# Patient Record
Sex: MALE | Race: WHITE | NOT HISPANIC OR LATINO | Employment: PART TIME | URBAN - METROPOLITAN AREA
[De-identification: names, ages, dates, MRNs, and addresses within clinical notes are randomized per-mention and may not be internally consistent; named-entity substitution may affect disease eponyms.]

---

## 2019-12-12 ENCOUNTER — ANESTHESIA (OUTPATIENT)
Dept: SURGERY | Facility: CLINIC | Age: 69
DRG: 354 | End: 2019-12-12
Payer: MEDICARE

## 2019-12-12 ENCOUNTER — APPOINTMENT (OUTPATIENT)
Dept: CT IMAGING | Facility: CLINIC | Age: 69
DRG: 354 | End: 2019-12-12
Attending: EMERGENCY MEDICINE
Payer: MEDICARE

## 2019-12-12 ENCOUNTER — HOSPITAL ENCOUNTER (INPATIENT)
Facility: CLINIC | Age: 69
LOS: 2 days | Discharge: HOME | DRG: 354 | End: 2019-12-14
Attending: EMERGENCY MEDICINE | Admitting: SURGERY
Payer: MEDICARE

## 2019-12-12 ENCOUNTER — ANESTHESIA EVENT (OUTPATIENT)
Dept: SURGERY | Facility: CLINIC | Age: 69
DRG: 354 | End: 2019-12-12
Payer: MEDICARE

## 2019-12-12 DIAGNOSIS — K56.609 SBO (SMALL BOWEL OBSTRUCTION) (H): ICD-10-CM

## 2019-12-12 DIAGNOSIS — Z87.19 S/P REPAIR OF VENTRAL HERNIA: Primary | ICD-10-CM

## 2019-12-12 DIAGNOSIS — Z98.890 S/P REPAIR OF VENTRAL HERNIA: Primary | ICD-10-CM

## 2019-12-12 DIAGNOSIS — K42.0 INCARCERATED UMBILICAL HERNIA: ICD-10-CM

## 2019-12-12 PROBLEM — K46.0 INCARCERATED HERNIA: Status: ACTIVE | Noted: 2019-12-12

## 2019-12-12 PROBLEM — K43.6 VENTRAL HERNIA WITH OBSTRUCTION BUT NO GANGRENE: Status: ACTIVE | Noted: 2019-12-12

## 2019-12-12 LAB
ABO + RH BLD: NORMAL
ABO + RH BLD: NORMAL
ANION GAP SERPL CALCULATED.3IONS-SCNC: 5 MMOL/L (ref 3–14)
APTT PPP: 29 SEC (ref 22–37)
BASOPHILS # BLD AUTO: 0 10E9/L (ref 0–0.2)
BASOPHILS NFR BLD AUTO: 0.3 %
BLD GP AB SCN SERPL QL: NORMAL
BLOOD BANK CMNT PATIENT-IMP: NORMAL
BUN SERPL-MCNC: 15 MG/DL (ref 7–30)
CALCIUM SERPL-MCNC: 8.9 MG/DL (ref 8.5–10.1)
CHLORIDE SERPL-SCNC: 106 MMOL/L (ref 94–109)
CO2 SERPL-SCNC: 27 MMOL/L (ref 20–32)
CREAT SERPL-MCNC: 1.07 MG/DL (ref 0.66–1.25)
DIFFERENTIAL METHOD BLD: ABNORMAL
EOSINOPHIL # BLD AUTO: 0.1 10E9/L (ref 0–0.7)
EOSINOPHIL NFR BLD AUTO: 0.8 %
ERYTHROCYTE [DISTWIDTH] IN BLOOD BY AUTOMATED COUNT: 13.2 % (ref 10–15)
GFR SERPL CREATININE-BSD FRML MDRD: 71 ML/MIN/{1.73_M2}
GLUCOSE BLDC GLUCOMTR-MCNC: 126 MG/DL (ref 70–99)
GLUCOSE SERPL-MCNC: 141 MG/DL (ref 70–99)
HCT VFR BLD AUTO: 46.8 % (ref 40–53)
HGB BLD-MCNC: 15.4 G/DL (ref 13.3–17.7)
IMM GRANULOCYTES # BLD: 0.1 10E9/L (ref 0–0.4)
IMM GRANULOCYTES NFR BLD: 0.6 %
INR PPP: 0.98 (ref 0.86–1.14)
LYMPHOCYTES # BLD AUTO: 1.1 10E9/L (ref 0.8–5.3)
LYMPHOCYTES NFR BLD AUTO: 10.1 %
MCH RBC QN AUTO: 30.9 PG (ref 26.5–33)
MCHC RBC AUTO-ENTMCNC: 32.9 G/DL (ref 31.5–36.5)
MCV RBC AUTO: 94 FL (ref 78–100)
MONOCYTES # BLD AUTO: 0.6 10E9/L (ref 0–1.3)
MONOCYTES NFR BLD AUTO: 5.6 %
NEUTROPHILS # BLD AUTO: 8.6 10E9/L (ref 1.6–8.3)
NEUTROPHILS NFR BLD AUTO: 82.6 %
NRBC # BLD AUTO: 0 10*3/UL
NRBC BLD AUTO-RTO: 0 /100
PLATELET # BLD AUTO: 323 10E9/L (ref 150–450)
POTASSIUM SERPL-SCNC: 4.5 MMOL/L (ref 3.4–5.3)
RBC # BLD AUTO: 4.99 10E12/L (ref 4.4–5.9)
SODIUM SERPL-SCNC: 138 MMOL/L (ref 133–144)
SPECIMEN EXP DATE BLD: NORMAL
WBC # BLD AUTO: 10.4 10E9/L (ref 4–11)

## 2019-12-12 PROCEDURE — 25800030 ZZH RX IP 258 OP 636: Performed by: ANESTHESIOLOGY

## 2019-12-12 PROCEDURE — 36000052 ZZH SURGERY LEVEL 2 EA 15 ADDTL MIN: Performed by: SURGERY

## 2019-12-12 PROCEDURE — 71000012 ZZH RECOVERY PHASE 1 LEVEL 1 FIRST HR: Performed by: SURGERY

## 2019-12-12 PROCEDURE — 00000146 ZZHCL STATISTIC GLUCOSE BY METER IP

## 2019-12-12 PROCEDURE — 86850 RBC ANTIBODY SCREEN: CPT | Performed by: EMERGENCY MEDICINE

## 2019-12-12 PROCEDURE — 36000050 ZZH SURGERY LEVEL 2 1ST 30 MIN: Performed by: SURGERY

## 2019-12-12 PROCEDURE — 40000306 ZZH STATISTIC PRE PROC ASSESS II: Performed by: SURGERY

## 2019-12-12 PROCEDURE — 25800030 ZZH RX IP 258 OP 636: Performed by: SURGERY

## 2019-12-12 PROCEDURE — 25800030 ZZH RX IP 258 OP 636: Performed by: NURSE ANESTHETIST, CERTIFIED REGISTERED

## 2019-12-12 PROCEDURE — 88302 TISSUE EXAM BY PATHOLOGIST: CPT | Mod: 26 | Performed by: SURGERY

## 2019-12-12 PROCEDURE — 71000013 ZZH RECOVERY PHASE 1 LEVEL 1 EA ADDTL HR: Performed by: SURGERY

## 2019-12-12 PROCEDURE — 88302 TISSUE EXAM BY PATHOLOGIST: CPT | Performed by: SURGERY

## 2019-12-12 PROCEDURE — 86901 BLOOD TYPING SEROLOGIC RH(D): CPT | Performed by: EMERGENCY MEDICINE

## 2019-12-12 PROCEDURE — 25000132 ZZH RX MED GY IP 250 OP 250 PS 637: Mod: GY | Performed by: SURGERY

## 2019-12-12 PROCEDURE — 25000128 H RX IP 250 OP 636: Performed by: SURGERY

## 2019-12-12 PROCEDURE — 74176 CT ABD & PELVIS W/O CONTRAST: CPT

## 2019-12-12 PROCEDURE — 99221 1ST HOSP IP/OBS SF/LOW 40: CPT | Mod: 57 | Performed by: SURGERY

## 2019-12-12 PROCEDURE — 85025 COMPLETE CBC W/AUTO DIFF WBC: CPT | Performed by: EMERGENCY MEDICINE

## 2019-12-12 PROCEDURE — 25000125 ZZHC RX 250: Performed by: NURSE ANESTHETIST, CERTIFIED REGISTERED

## 2019-12-12 PROCEDURE — 25000128 H RX IP 250 OP 636: Performed by: NURSE ANESTHETIST, CERTIFIED REGISTERED

## 2019-12-12 PROCEDURE — 27210794 ZZH OR GENERAL SUPPLY STERILE: Performed by: SURGERY

## 2019-12-12 PROCEDURE — 85730 THROMBOPLASTIN TIME PARTIAL: CPT | Performed by: EMERGENCY MEDICINE

## 2019-12-12 PROCEDURE — 49561 ZZHC REPAIR INITIAL INCISIONAL HERNIA; INCARCERATED OR STRANGULATED: CPT | Performed by: SURGERY

## 2019-12-12 PROCEDURE — 25000125 ZZHC RX 250: Performed by: SURGERY

## 2019-12-12 PROCEDURE — 86900 BLOOD TYPING SEROLOGIC ABO: CPT | Performed by: EMERGENCY MEDICINE

## 2019-12-12 PROCEDURE — 37000008 ZZH ANESTHESIA TECHNICAL FEE, 1ST 30 MIN: Performed by: SURGERY

## 2019-12-12 PROCEDURE — 0WQF0ZZ REPAIR ABDOMINAL WALL, OPEN APPROACH: ICD-10-PCS | Performed by: SURGERY

## 2019-12-12 PROCEDURE — 12000000 ZZH R&B MED SURG/OB

## 2019-12-12 PROCEDURE — 85610 PROTHROMBIN TIME: CPT | Performed by: EMERGENCY MEDICINE

## 2019-12-12 PROCEDURE — 49561 ZZHC REPAIR INITIAL INCISIONAL HERNIA; INCARCERATED OR STRANGULATED: CPT | Mod: AS | Performed by: PHYSICIAN ASSISTANT

## 2019-12-12 PROCEDURE — 99285 EMERGENCY DEPT VISIT HI MDM: CPT | Mod: 25

## 2019-12-12 PROCEDURE — 93010 ELECTROCARDIOGRAM REPORT: CPT | Performed by: INTERNAL MEDICINE

## 2019-12-12 PROCEDURE — 96374 THER/PROPH/DIAG INJ IV PUSH: CPT | Mod: 59

## 2019-12-12 PROCEDURE — 37000009 ZZH ANESTHESIA TECHNICAL FEE, EACH ADDTL 15 MIN: Performed by: SURGERY

## 2019-12-12 PROCEDURE — 25000128 H RX IP 250 OP 636: Performed by: EMERGENCY MEDICINE

## 2019-12-12 PROCEDURE — 80048 BASIC METABOLIC PNL TOTAL CA: CPT | Performed by: EMERGENCY MEDICINE

## 2019-12-12 RX ORDER — OXYCODONE HYDROCHLORIDE 5 MG/1
5-10 TABLET ORAL EVERY 4 HOURS PRN
Status: DISCONTINUED | OUTPATIENT
Start: 2019-12-12 | End: 2019-12-14 | Stop reason: HOSPADM

## 2019-12-12 RX ORDER — ONDANSETRON 2 MG/ML
INJECTION INTRAMUSCULAR; INTRAVENOUS PRN
Status: DISCONTINUED | OUTPATIENT
Start: 2019-12-12 | End: 2019-12-12

## 2019-12-12 RX ORDER — FENTANYL CITRATE 50 UG/ML
25-50 INJECTION, SOLUTION INTRAMUSCULAR; INTRAVENOUS
Status: DISCONTINUED | OUTPATIENT
Start: 2019-12-12 | End: 2019-12-12 | Stop reason: HOSPADM

## 2019-12-12 RX ORDER — LIDOCAINE HYDROCHLORIDE 20 MG/ML
JELLY TOPICAL ONCE
Status: COMPLETED | OUTPATIENT
Start: 2019-12-12 | End: 2019-12-13

## 2019-12-12 RX ORDER — NALOXONE HYDROCHLORIDE 0.4 MG/ML
.1-.4 INJECTION, SOLUTION INTRAMUSCULAR; INTRAVENOUS; SUBCUTANEOUS
Status: DISCONTINUED | OUTPATIENT
Start: 2019-12-12 | End: 2019-12-14 | Stop reason: HOSPADM

## 2019-12-12 RX ORDER — GLYCOPYRROLATE 0.2 MG/ML
INJECTION, SOLUTION INTRAMUSCULAR; INTRAVENOUS PRN
Status: DISCONTINUED | OUTPATIENT
Start: 2019-12-12 | End: 2019-12-12

## 2019-12-12 RX ORDER — ONDANSETRON 4 MG/1
4 TABLET, ORALLY DISINTEGRATING ORAL EVERY 6 HOURS PRN
Status: DISCONTINUED | OUTPATIENT
Start: 2019-12-12 | End: 2019-12-14 | Stop reason: HOSPADM

## 2019-12-12 RX ORDER — SULFAMETHOXAZOLE/TRIMETHOPRIM 800-160 MG
1 TABLET ORAL 2 TIMES DAILY
Status: DISCONTINUED | OUTPATIENT
Start: 2019-12-12 | End: 2019-12-14 | Stop reason: HOSPADM

## 2019-12-12 RX ORDER — NALOXONE HYDROCHLORIDE 0.4 MG/ML
.1-.4 INJECTION, SOLUTION INTRAMUSCULAR; INTRAVENOUS; SUBCUTANEOUS
Status: DISCONTINUED | OUTPATIENT
Start: 2019-12-12 | End: 2019-12-12 | Stop reason: HOSPADM

## 2019-12-12 RX ORDER — METOCLOPRAMIDE 10 MG/1
10 TABLET ORAL EVERY 6 HOURS PRN
Status: DISCONTINUED | OUTPATIENT
Start: 2019-12-12 | End: 2019-12-12 | Stop reason: HOSPADM

## 2019-12-12 RX ORDER — NEOSTIGMINE METHYLSULFATE 1 MG/ML
VIAL (ML) INJECTION PRN
Status: DISCONTINUED | OUTPATIENT
Start: 2019-12-12 | End: 2019-12-12

## 2019-12-12 RX ORDER — HYDRALAZINE HYDROCHLORIDE 20 MG/ML
2.5-5 INJECTION INTRAMUSCULAR; INTRAVENOUS EVERY 10 MIN PRN
Status: DISCONTINUED | OUTPATIENT
Start: 2019-12-12 | End: 2019-12-12 | Stop reason: HOSPADM

## 2019-12-12 RX ORDER — HYDROMORPHONE HYDROCHLORIDE 1 MG/ML
.3-.5 INJECTION, SOLUTION INTRAMUSCULAR; INTRAVENOUS; SUBCUTANEOUS EVERY 10 MIN PRN
Status: DISCONTINUED | OUTPATIENT
Start: 2019-12-12 | End: 2019-12-12 | Stop reason: HOSPADM

## 2019-12-12 RX ORDER — BUPIVACAINE HYDROCHLORIDE 5 MG/ML
INJECTION, SOLUTION EPIDURAL; INTRACAUDAL PRN
Status: DISCONTINUED | OUTPATIENT
Start: 2019-12-12 | End: 2019-12-12 | Stop reason: HOSPADM

## 2019-12-12 RX ORDER — ONDANSETRON 2 MG/ML
4 INJECTION INTRAMUSCULAR; INTRAVENOUS EVERY 30 MIN PRN
Status: DISCONTINUED | OUTPATIENT
Start: 2019-12-12 | End: 2019-12-12 | Stop reason: HOSPADM

## 2019-12-12 RX ORDER — LIDOCAINE 40 MG/G
CREAM TOPICAL
Status: DISCONTINUED | OUTPATIENT
Start: 2019-12-12 | End: 2019-12-14 | Stop reason: HOSPADM

## 2019-12-12 RX ORDER — DEXAMETHASONE SODIUM PHOSPHATE 4 MG/ML
INJECTION, SOLUTION INTRA-ARTICULAR; INTRALESIONAL; INTRAMUSCULAR; INTRAVENOUS; SOFT TISSUE PRN
Status: DISCONTINUED | OUTPATIENT
Start: 2019-12-12 | End: 2019-12-12

## 2019-12-12 RX ORDER — DEXAMETHASONE SODIUM PHOSPHATE 4 MG/ML
4 INJECTION, SOLUTION INTRA-ARTICULAR; INTRALESIONAL; INTRAMUSCULAR; INTRAVENOUS; SOFT TISSUE EVERY 10 MIN PRN
Status: DISCONTINUED | OUTPATIENT
Start: 2019-12-12 | End: 2019-12-12 | Stop reason: HOSPADM

## 2019-12-12 RX ORDER — DIMENHYDRINATE 50 MG/ML
25 INJECTION, SOLUTION INTRAMUSCULAR; INTRAVENOUS
Status: DISCONTINUED | OUTPATIENT
Start: 2019-12-12 | End: 2019-12-12 | Stop reason: HOSPADM

## 2019-12-12 RX ORDER — PROPOFOL 10 MG/ML
INJECTION, EMULSION INTRAVENOUS PRN
Status: DISCONTINUED | OUTPATIENT
Start: 2019-12-12 | End: 2019-12-12

## 2019-12-12 RX ORDER — METOPROLOL TARTRATE 1 MG/ML
1-2 INJECTION, SOLUTION INTRAVENOUS EVERY 5 MIN PRN
Status: DISCONTINUED | OUTPATIENT
Start: 2019-12-12 | End: 2019-12-12 | Stop reason: HOSPADM

## 2019-12-12 RX ORDER — FENTANYL CITRATE 50 UG/ML
INJECTION, SOLUTION INTRAMUSCULAR; INTRAVENOUS PRN
Status: DISCONTINUED | OUTPATIENT
Start: 2019-12-12 | End: 2019-12-12

## 2019-12-12 RX ORDER — SODIUM CHLORIDE, SODIUM LACTATE, POTASSIUM CHLORIDE, CALCIUM CHLORIDE 600; 310; 30; 20 MG/100ML; MG/100ML; MG/100ML; MG/100ML
INJECTION, SOLUTION INTRAVENOUS CONTINUOUS
Status: DISCONTINUED | OUTPATIENT
Start: 2019-12-12 | End: 2019-12-12 | Stop reason: HOSPADM

## 2019-12-12 RX ORDER — HYDROMORPHONE HYDROCHLORIDE 1 MG/ML
.3-.5 INJECTION, SOLUTION INTRAMUSCULAR; INTRAVENOUS; SUBCUTANEOUS
Status: DISCONTINUED | OUTPATIENT
Start: 2019-12-12 | End: 2019-12-14 | Stop reason: HOSPADM

## 2019-12-12 RX ORDER — CEFOTETAN DISODIUM 2 G/20ML
2 INJECTION, POWDER, FOR SOLUTION INTRAMUSCULAR; INTRAVENOUS
Status: DISCONTINUED | OUTPATIENT
Start: 2019-12-12 | End: 2019-12-12 | Stop reason: HOSPADM

## 2019-12-12 RX ORDER — LIDOCAINE HYDROCHLORIDE 10 MG/ML
INJECTION, SOLUTION INFILTRATION; PERINEURAL PRN
Status: DISCONTINUED | OUTPATIENT
Start: 2019-12-12 | End: 2019-12-12

## 2019-12-12 RX ORDER — SULFAMETHOXAZOLE/TRIMETHOPRIM 800-160 MG
1 TABLET ORAL 2 TIMES DAILY
COMMUNITY
Start: 2019-12-09 | End: 2019-12-19

## 2019-12-12 RX ORDER — MORPHINE SULFATE 4 MG/ML
4 INJECTION, SOLUTION INTRAMUSCULAR; INTRAVENOUS ONCE
Status: COMPLETED | OUTPATIENT
Start: 2019-12-12 | End: 2019-12-12

## 2019-12-12 RX ORDER — ACETAMINOPHEN 325 MG/1
650 TABLET ORAL EVERY 4 HOURS PRN
Status: DISCONTINUED | OUTPATIENT
Start: 2019-12-15 | End: 2019-12-14 | Stop reason: HOSPADM

## 2019-12-12 RX ORDER — ONDANSETRON 4 MG/1
4 TABLET, ORALLY DISINTEGRATING ORAL EVERY 30 MIN PRN
Status: DISCONTINUED | OUTPATIENT
Start: 2019-12-12 | End: 2019-12-12 | Stop reason: HOSPADM

## 2019-12-12 RX ORDER — CEFOTETAN DISODIUM 2 G/20ML
2 INJECTION, POWDER, FOR SOLUTION INTRAMUSCULAR; INTRAVENOUS EVERY 6 HOURS PRN
Status: DISCONTINUED | OUTPATIENT
Start: 2019-12-12 | End: 2019-12-12 | Stop reason: HOSPADM

## 2019-12-12 RX ORDER — PROCHLORPERAZINE MALEATE 5 MG
5 TABLET ORAL EVERY 6 HOURS PRN
Status: DISCONTINUED | OUTPATIENT
Start: 2019-12-12 | End: 2019-12-14 | Stop reason: HOSPADM

## 2019-12-12 RX ORDER — ONDANSETRON 2 MG/ML
4 INJECTION INTRAMUSCULAR; INTRAVENOUS EVERY 6 HOURS PRN
Status: DISCONTINUED | OUTPATIENT
Start: 2019-12-12 | End: 2019-12-14 | Stop reason: HOSPADM

## 2019-12-12 RX ORDER — METOCLOPRAMIDE HYDROCHLORIDE 5 MG/ML
10 INJECTION INTRAMUSCULAR; INTRAVENOUS EVERY 6 HOURS PRN
Status: DISCONTINUED | OUTPATIENT
Start: 2019-12-12 | End: 2019-12-12 | Stop reason: HOSPADM

## 2019-12-12 RX ORDER — ACETAMINOPHEN 325 MG/1
975 TABLET ORAL EVERY 8 HOURS
Status: DISCONTINUED | OUTPATIENT
Start: 2019-12-12 | End: 2019-12-14 | Stop reason: HOSPADM

## 2019-12-12 RX ORDER — DEXTROSE MONOHYDRATE, SODIUM CHLORIDE, AND POTASSIUM CHLORIDE 50; 1.49; 4.5 G/1000ML; G/1000ML; G/1000ML
INJECTION, SOLUTION INTRAVENOUS CONTINUOUS
Status: DISCONTINUED | OUTPATIENT
Start: 2019-12-12 | End: 2019-12-14 | Stop reason: HOSPADM

## 2019-12-12 RX ORDER — MEPERIDINE HYDROCHLORIDE 25 MG/ML
12.5 INJECTION INTRAMUSCULAR; INTRAVENOUS; SUBCUTANEOUS
Status: DISCONTINUED | OUTPATIENT
Start: 2019-12-12 | End: 2019-12-12 | Stop reason: HOSPADM

## 2019-12-12 RX ADMIN — PHENYLEPHRINE HYDROCHLORIDE 100 MCG: 10 INJECTION INTRAVENOUS at 18:55

## 2019-12-12 RX ADMIN — ROCURONIUM BROMIDE 50 MG: 10 INJECTION INTRAVENOUS at 18:45

## 2019-12-12 RX ADMIN — MIDAZOLAM 2 MG: 1 INJECTION INTRAMUSCULAR; INTRAVENOUS at 18:42

## 2019-12-12 RX ADMIN — MORPHINE SULFATE 4 MG: 4 INJECTION INTRAVENOUS at 17:25

## 2019-12-12 RX ADMIN — ONDANSETRON HYDROCHLORIDE 0.6 MG: 2 INJECTION, SOLUTION INTRAVENOUS at 19:37

## 2019-12-12 RX ADMIN — PHENYLEPHRINE HYDROCHLORIDE 150 MCG: 10 INJECTION INTRAVENOUS at 19:24

## 2019-12-12 RX ADMIN — DEXAMETHASONE SODIUM PHOSPHATE 4 MG: 4 INJECTION, SOLUTION INTRA-ARTICULAR; INTRALESIONAL; INTRAMUSCULAR; INTRAVENOUS; SOFT TISSUE at 18:45

## 2019-12-12 RX ADMIN — SODIUM CHLORIDE, POTASSIUM CHLORIDE, SODIUM LACTATE AND CALCIUM CHLORIDE: 600; 310; 30; 20 INJECTION, SOLUTION INTRAVENOUS at 19:40

## 2019-12-12 RX ADMIN — GLYCOPYRROLATE 0.2 MG: 0.2 INJECTION, SOLUTION INTRAMUSCULAR; INTRAVENOUS at 18:45

## 2019-12-12 RX ADMIN — POTASSIUM CHLORIDE, DEXTROSE MONOHYDRATE AND SODIUM CHLORIDE: 150; 5; 450 INJECTION, SOLUTION INTRAVENOUS at 22:58

## 2019-12-12 RX ADMIN — FENTANYL CITRATE 100 MCG: 50 INJECTION, SOLUTION INTRAMUSCULAR; INTRAVENOUS at 18:49

## 2019-12-12 RX ADMIN — ROCURONIUM BROMIDE 10 MG: 10 INJECTION INTRAVENOUS at 19:15

## 2019-12-12 RX ADMIN — PROPOFOL 200 MG: 10 INJECTION, EMULSION INTRAVENOUS at 18:45

## 2019-12-12 RX ADMIN — Medication 3 MG: at 19:37

## 2019-12-12 RX ADMIN — HYDROMORPHONE HYDROCHLORIDE 1 MG: 1 INJECTION, SOLUTION INTRAMUSCULAR; INTRAVENOUS; SUBCUTANEOUS at 19:08

## 2019-12-12 RX ADMIN — CEFOTETAN DISODIUM 2 G: 2 INJECTION, POWDER, FOR SOLUTION INTRAMUSCULAR; INTRAVENOUS at 18:42

## 2019-12-12 RX ADMIN — SODIUM CHLORIDE, POTASSIUM CHLORIDE, SODIUM LACTATE AND CALCIUM CHLORIDE: 600; 310; 30; 20 INJECTION, SOLUTION INTRAVENOUS at 18:42

## 2019-12-12 RX ADMIN — LIDOCAINE HYDROCHLORIDE 50 MG: 10 INJECTION, SOLUTION INFILTRATION; PERINEURAL at 18:45

## 2019-12-12 RX ADMIN — SULFAMETHOXAZOLE AND TRIMETHOPRIM 1 TABLET: 800; 160 TABLET ORAL at 22:59

## 2019-12-12 RX ADMIN — FENTANYL CITRATE 100 MCG: 50 INJECTION, SOLUTION INTRAMUSCULAR; INTRAVENOUS at 19:16

## 2019-12-12 RX ADMIN — ACETAMINOPHEN 975 MG: 325 TABLET, FILM COATED ORAL at 22:59

## 2019-12-12 SDOH — HEALTH STABILITY: MENTAL HEALTH: HOW OFTEN DO YOU HAVE A DRINK CONTAINING ALCOHOL?: NEVER

## 2019-12-12 ASSESSMENT — ENCOUNTER SYMPTOMS
VOMITING: 0
CONSTIPATION: 0
DIARRHEA: 0

## 2019-12-12 NOTE — ED PROVIDER NOTES
"  History     Chief Complaint:  Hernia    The history is provided by the patient.      Kunal Sharma is a 69 year old male who presents to the emergency department today for evaluation of an umbilical hernia. The patient reports he got the hernia approximately four weeks ago; he has consulted with a surgeon and has surgery planned for early January. He states it had not been painful until this morning when it became tender. He went to urgent care where they spent approximately a half hour attempting to reduce it and used ice; they were unable to reduce it and sent him here for further evaluation. He states prior to today the hernia was always \"loose\" and easily reducible. The patient reports it seems to be more tender and swollen now after reduction attempts. He has not been vomiting and denies having any diarrhea, constipation, or problems passing gas. The patient denies anticoagulation use and states his last meal was around 0800 this morning.    Allergies:  No Known Drug Allergies     Medications:    Bactrim    Past Medical History:    Hernia    Past Surgical History:    Surgical history reviewed. No pertinent surgical history.     Family History:    Family history reviewed. No pertinent family history.    Social History:  The patient was unaccompanied to the ED.    Review of Systems   Gastrointestinal: Negative for constipation, diarrhea and vomiting.        Umbilical hernia  Passing gas normally   All other systems reviewed and are negative.        Physical Exam     Patient Vitals for the past 24 hrs:   BP Temp Temp src Pulse Heart Rate Resp SpO2 Weight   12/12/19 1545 139/78 -- -- 76 -- -- 98 % --   12/12/19 1530 (!) 165/75 -- -- -- -- -- -- --   12/12/19 1519 (!) 169/88 97.8  F (36.6  C) Temporal -- 78 18 99 % 114 kg (251 lb 5.2 oz)      Physical Exam  Vitals signs reviewed.   Cardiovascular:      Rate and Rhythm: Normal rate and regular rhythm.      Pulses: Normal pulses.      Heart sounds: Normal heart " sounds.   Pulmonary:      Effort: Pulmonary effort is normal.   Abdominal:      Hernia: A hernia is present.      Comments: Periumbilically there is a discoloration of the umbilical hernia with strangulated bowel.  Patient is mildly tender with palpation.  It is tense and firm and irreducible.   Skin:     Capillary Refill: Capillary refill takes less than 2 seconds.   Neurological:      Mental Status: He is alert.   Psychiatric:         Mood and Affect: Mood normal.           Emergency Department Course     Imaging:  Radiology findings were communicated with the patient who voiced understanding of the findings.  CT, Abdomen & Pelvis with Oral Contrast  Pending  Reading per radiology    Laboratory:  Laboratory findings were communicated with the patient who voiced understanding of the findings.  CBC: Pending  BMP: Pending  INR: Pending  PTT: Pending  ABO/Rh Type and Screen: Pending     Emergency Department Course:  1537 Nursing notes and vitals reviewed.  1539 I performed an exam of the patient as documented above.   1557 I spoke with Dr. Forte of the general surgery service from [place] regarding patient's presentation, findings, and plan of care. Discussed preforming a CT and having the oncoming emergency department physician to follow up with the patient and plan of care.   1610 Patient was rechecked and updated prior to signing him out awaiting CT.    The patient was signed out to the oncoming emergency department physician, Dr. Esparza, while awaiting CT results.     I personally reviewed the treatment plan with the Patient and answered all related questions prior to transfer of care.     Impression & Plan      Medical Decision Making:  Patient presents with pain and swelling around his periumbilical hernia.  This was attempted to be reduced in urgent care and unsuccessful.  Attempted manual reduction was performed by me without success.  Patient is quite edematous ventral hernia root and unable to identify  the opening or able to pass all bowel through the opening.  Care was discussed with the surgeon on-call Dr. Forte who is recommending a CT scan to verify bowel incarcerated in the hernia.  Patient is signed out to Dr. Esparza pending CT results.  When CT proves clinical findings patient will be admitted to the OR for emergency surgical repair of ventral hernia.  Patient is signed out at 4 PM pending CT results.        Diagnosis:    ICD-10-CM    1. Incarcerated umbilical hernia K42.0 Glucose by meter     Glucose by meter     Glucose by meter     Glucose by meter   2. SBO (small bowel obstruction) (H) K56.609        Disposition:  The care of this patient was signed out to my partner Dr. Esparza.     Scribe Disclosure:  I, Keyonna Gaspar, am serving as a scribe at 3:39 PM on 12/12/2019 to document services personally performed by Miguel Leong MD based on my observations and the provider's statements to me.    12/12/2019   [facility] EMERGENCY DEPARTMENT       Miguel Leong MD  12/13/19 1047

## 2019-12-12 NOTE — ED TRIAGE NOTES
Pt arrives from [facility] for incarerated umbilical hernia. Pt states has had it for weeks, today it began to hurt so he went into . They could not reduce it so sent here. Scheduled for surgery on 1/4/2020. ABCs intact.

## 2019-12-13 LAB — GLUCOSE BLDC GLUCOMTR-MCNC: 183 MG/DL (ref 70–99)

## 2019-12-13 PROCEDURE — 25800030 ZZH RX IP 258 OP 636: Performed by: SURGERY

## 2019-12-13 PROCEDURE — 25000132 ZZH RX MED GY IP 250 OP 250 PS 637: Mod: GY | Performed by: SURGERY

## 2019-12-13 PROCEDURE — 00000146 ZZHCL STATISTIC GLUCOSE BY METER IP

## 2019-12-13 PROCEDURE — 12000000 ZZH R&B MED SURG/OB

## 2019-12-13 PROCEDURE — 25000128 H RX IP 250 OP 636: Performed by: SURGERY

## 2019-12-13 RX ORDER — LIDOCAINE HYDROCHLORIDE 20 MG/ML
JELLY TOPICAL ONCE
Status: DISCONTINUED | OUTPATIENT
Start: 2019-12-13 | End: 2019-12-14 | Stop reason: CLARIF

## 2019-12-13 RX ADMIN — POTASSIUM CHLORIDE, DEXTROSE MONOHYDRATE AND SODIUM CHLORIDE: 150; 5; 450 INJECTION, SOLUTION INTRAVENOUS at 08:46

## 2019-12-13 RX ADMIN — SULFAMETHOXAZOLE AND TRIMETHOPRIM 1 TABLET: 800; 160 TABLET ORAL at 22:16

## 2019-12-13 RX ADMIN — ONDANSETRON 4 MG: 4 TABLET, ORALLY DISINTEGRATING ORAL at 20:24

## 2019-12-13 RX ADMIN — ACETAMINOPHEN 975 MG: 325 TABLET, FILM COATED ORAL at 06:33

## 2019-12-13 RX ADMIN — OXYCODONE HYDROCHLORIDE 10 MG: 5 TABLET ORAL at 22:17

## 2019-12-13 RX ADMIN — POTASSIUM CHLORIDE, DEXTROSE MONOHYDRATE AND SODIUM CHLORIDE: 150; 5; 450 INJECTION, SOLUTION INTRAVENOUS at 17:50

## 2019-12-13 RX ADMIN — SULFAMETHOXAZOLE AND TRIMETHOPRIM 1 TABLET: 800; 160 TABLET ORAL at 07:52

## 2019-12-13 RX ADMIN — ENOXAPARIN SODIUM 40 MG: 40 INJECTION SUBCUTANEOUS at 17:50

## 2019-12-13 RX ADMIN — OXYCODONE HYDROCHLORIDE 10 MG: 5 TABLET ORAL at 06:32

## 2019-12-13 RX ADMIN — PROCHLORPERAZINE EDISYLATE 5 MG: 5 INJECTION, SOLUTION INTRAMUSCULAR; INTRAVENOUS at 22:25

## 2019-12-13 RX ADMIN — LIDOCAINE HYDROCHLORIDE: 20 JELLY TOPICAL at 04:05

## 2019-12-13 RX ADMIN — ACETAMINOPHEN 975 MG: 325 TABLET, FILM COATED ORAL at 14:33

## 2019-12-13 ASSESSMENT — ACTIVITIES OF DAILY LIVING (ADL)
ADLS_ACUITY_SCORE: 13
ADLS_ACUITY_SCORE: 11
ADLS_ACUITY_SCORE: 13
ADLS_ACUITY_SCORE: 11

## 2019-12-13 NOTE — ANESTHESIA PREPROCEDURE EVALUATION
Anesthesia Pre-Procedure Evaluation    Patient: Kunal Sharma   MRN: [id number] : 1950          Preoperative Diagnosis: * No pre-op diagnosis entered *    Procedure(s):  HERNIORRHAPHY, INCISIONAL, OPEN, INCARCERATED    History reviewed. No pertinent past medical history.  History reviewed. No pertinent surgical history.  Anesthesia Evaluation     . Pt has not had prior anesthetic            ROS/MED HX    ENT/Pulmonary:  - neg pulmonary ROS     Neurologic:  - neg neurologic ROS     Cardiovascular:  - neg cardiovascular ROS       METS/Exercise Tolerance:     Hematologic: Comments: Lab Test        19                       1621          WBC          10.4          HGB          15.4          MCV          94            PLT          323           INR          0.98           Lab Test        19                       1621          NA           138           POTASSIUM    4.5           CHLORIDE     106           CO2          27            BUN          15            CR           1.07          ANIONGAP     5             AMADEO          8.9           GLC          141*           - neg hematologic  ROS       Musculoskeletal:   (+)  other musculoskeletal- incarcerated hernia      GI/Hepatic:  - neg GI/hepatic ROS       Renal/Genitourinary:  - ROS Renal section negative       Endo:     (+) Obesity, .      Psychiatric:  - neg psychiatric ROS       Infectious Disease:  - neg infectious disease ROS       Malignancy:      - no malignancy   Other:    (+) No chance of pregnancy C-spine cleared: N/A, no H/O Chronic Pain,no other significant disability   - neg other ROS                      Physical Exam  Normal systems: cardiovascular, pulmonary and dental    Airway   Mallampati: I  TM distance: >3 FB  Neck ROM: full    Dental     Cardiovascular       Pulmonary             Lab Results   Component Value Date    WBC 10.4 2019    HGB 15.4 2019    HCT 46.8 2019     2019     2019     POTASSIUM 4.5 12/12/2019    CHLORIDE 106 12/12/2019    CO2 27 12/12/2019    BUN 15 12/12/2019    CR 1.07 12/12/2019     (H) 12/12/2019    AMADEO 8.9 12/12/2019    PTT 29 12/12/2019    INR 0.98 12/12/2019       Preop Vitals  BP Readings from Last 3 Encounters:   12/12/19 (!) 158/96    Pulse Readings from Last 3 Encounters:   12/12/19 76      Resp Readings from Last 3 Encounters:   12/12/19 18    SpO2 Readings from Last 3 Encounters:   12/12/19 96%      Temp Readings from Last 1 Encounters:   12/12/19 98.2  F (36.8  C) (Temporal)    Ht Readings from Last 1 Encounters:   No data found for Ht      Wt Readings from Last 1 Encounters:   12/12/19 114 kg (251 lb 5.2 oz)    There is no height or weight on file to calculate BMI.       Anesthesia Plan      History & Physical Review  History and physical reviewed and following examination; no interval change.    ASA Status:  2 .    NPO Status:  > 8 hours    Plan for General and ETT with Intravenous induction. Maintenance will be Balanced.    PONV prophylaxis:  Dexamethasone or Solumedrol and Ondansetron (or other 5HT-3)       Postoperative Care  Postoperative pain management:  IV analgesics.      Consents  Anesthetic plan, risks, benefits and alternatives discussed with:  Patient.  Use of blood products discussed: Yes.   Use of blood products discussed with Patient.  Consented to blood products.  .                 Miguel Linder MD                    .

## 2019-12-13 NOTE — ANESTHESIA POSTPROCEDURE EVALUATION
Patient: Kunal Sharma    Procedure(s):  HERNIORRHAPHY, INCISIONAL, OPEN, INCARCERATED    Diagnosis:* No pre-op diagnosis entered *  Diagnosis Additional Information: No value filed.    Anesthesia Type:  General, ETT    Note:  Anesthesia Post Evaluation    Patient location during evaluation: PACU  Patient participation: Able to fully participate in evaluation  Level of consciousness: awake and alert  Pain management: adequate  Airway patency: patent  Cardiovascular status: acceptable  Respiratory status: acceptable  Hydration status: acceptable  PONV: controlled     Anesthetic complications: None          Last vitals:  Vitals:    12/12/19 2030 12/12/19 2055 12/12/19 2140   BP: (!) 150/86 (!) 143/82 (!) (P) 146/83   Pulse:  81    Resp: 14 16 (P) 16   Temp:      SpO2:  95% (P) 95%         Electronically Signed By: Miguel Linder MD  December 12, 2019  10:05 PM

## 2019-12-13 NOTE — PROGRESS NOTES
[facility]   General Surgery Progress Note           Assessment and Plan:   Assessment:   POD#1 s/p Procedure(s):  HERNIORRHAPHY, INCISIONAL, OPEN, INCARCERATED   Primary repair, findings of numerous chronic interloop adhesions and infammation  Postoperative ileus, as expected  Afebrile  +UTI      Plan:   -OK to start sips of clear liquids, take it slow until flatus  -Pain control: tylenol, oxycodone  -VTE prophylaxis: lovenox PCDs  -Bactrim for UTI  -Increase activity as tolerated  -Dispo: discharge pending return of bowel function and diet tolerance         Interval History:   Comfortable in bed, reports mild pain, well controlled with PO medication. Denies nausea/vomiting or bloating. -flatus. Up in room. Voiding independently.        Physical Exam:   Blood pressure 128/67, pulse 80, temperature 98.7  F (37.1  C), temperature source Oral, resp. rate 22, weight 114 kg (251 lb 5.2 oz), SpO2 94 %.    I/O last 3 completed shifts:  In: 1200 [I.V.:1200]  Out: 1185 [Urine:1175; Blood:10]    Abdomen:   soft, obese, tenderness noted at incision site and +BS   Inc(s) - clean, dry, dermabond intact              Data:       Mindi Meza PA-C     Seen and agree,    Lenny Forte MD  [facility]

## 2019-12-13 NOTE — PLAN OF CARE
A&O. VSS> SBA w/ GB. Tolerating clear liquids, no increased pain or onset of nausea with oral intake, patient to remain on clears until passing flatus. Voiding frequently using urinal at bedside, last  mL at around 1440. Incision CDI. Patient taking scheduled tylenol and occasionally PRN Oxycodone for pain control. Plan is to go home on discharge when medically ready, possibly tomorrow.

## 2019-12-13 NOTE — ANESTHESIA CARE TRANSFER NOTE
Patient: Kunal Sharma    Procedure(s):  HERNIORRHAPHY, INCISIONAL, OPEN, INCARCERATED    Diagnosis: * No pre-op diagnosis entered *  Diagnosis Additional Information: No value filed.    Anesthesia Type:   General, ETT     Note:  Airway :Face Mask  Patient transferred to:PACU  Handoff Report: Identifed the Patient, Identified the Reponsible Provider, Reviewed the pertinent medical history, Discussed the surgical course, Reviewed Intra-OP anesthesia mangement and issues during anesthesia, Set expectations for post-procedure period and Allowed opportunity for questions and acknowledgement of understanding      Vitals: (Last set prior to Anesthesia Care Transfer)    CRNA VITALS  12/12/2019 1923 - 12/12/2019 1959 12/12/2019             Pulse:  112    SpO2:  100 %    Resp Rate (observed):  17                Electronically Signed By: BUSTER Salazar CRNA  December 12, 2019  7:59 PM

## 2019-12-13 NOTE — OP NOTE
General Surgery Operative Note    Pre-operative diagnosis:  Nonreducible ventral hernia with obstruction   Post-operative diagnosis:  Strangulated ventral hernia with obstruction   Procedure:  Repair of strangulated ventral hernia with obstruction   Surgeon: Lenny Forte MD   Assistant(s): Mindi Meza PA-C  - the PA's assistance was medically necessary in providing adequate exposure in the operating field, maintaining hemostasis, cuttting suture, clamping and ligating blood vessels, and visualization of the anatomic structures throughout the surgical procedure.   Anesthesia: General    Estimated blood loss: 10 cc's   Drains placed: None   Complications:  None   Findings:   Tensely distended hernia sac containing hemorrhagic appearing fluid and bowel with moderate inflammatory change consistent with strangulation.  There were numerous chronic interloop adhesions within these loops of bowel, suggesting that they had been chronically located within the hernia sac.  There was too much acute inflammation here to safely take down these chronic adhesions.  The hernia was reduced and closed primarily, as it was not felt that placement of mesh was appropriate in the inflamed field.     Indications for operation: This is a 69-year-old gentleman who presented with several hours of painful bulging near his umbilicus.  The patient was diagnosed with a hernia fairly recently and had a surgical consultation scheduled for January of next year.  The patient suddenly developed pain at about noon today.  An attempt was made first in the urgent care and then in the emergency room to reduce this hernia.  CT scan showed that it contained loops of bowel.  There was sufficient fluid within these loops to suggest obstruction.  Urgent operation was recommended, and the procedure, along with its risks and complications, was discussed with the patient.  He agreed to proceed.    Details of the operation: After informed consent,  the patient was taken to the operating room where he underwent satisfactory induction of general anesthesia.  The patient was sterilely prepped and draped and an infraumbilical skin incision was made over the prominent bulge.  Dissection was carried down until a hernia sac with visibly dark contents was encountered.  This was carefully dissected out and opened sharply, resulting in a rush of hemorrhagic fluid.  The sac was widely opened, revealing loops of small bowel with chronic yet inflamed interloop adhesions.  The bowel appeared viable.  The fascia was opened slightly in each direction laterally to allow reduction of the small bowel loops.  Was not felt that these interloop adhesions could be safely taken down due to the degree of inflammation present.  Once the hernia was reduced, the redundant preperitoneal tissue was excised.  The redundant sac was also excised and the peritoneum was closed using a running 0 Vicryl suture.  The fascia was now closed longitudinally using a running looped 0 PDS suture as well as an additional 0 PDS suture.  This allowed a relatively tension-free closure.  The superior tissue here was quite thin.  The base of the umbilicus was now sutured down using a 3-0 Vicryl suture.  The area was infiltrated with 0.5% Marcaine and irrigated out.  There were somewhat prominent vessels within the umbilical skin.  Hemostasis was assured using electrocautery.  The subdermal tissues were now approximated using interrupted 3-0 Vicryl sutures and the skin was closed using skin adhesive.    The patient tolerated the procedure well and was transferred to the recovery room in satisfactory condition.  Sponge and needle counts were correct at the close of the case.    Specimens:   ID Type Source Tests Collected by Time Destination   A : Ventral Hernia Contents  Tissue Hernia Sac SURGICAL PATHOLOGY EXAM Lenny Forte MD 12/12/2019  7:31 PM            Lenny Forte MD

## 2019-12-13 NOTE — PLAN OF CARE
[facility] Orthopedic Nursing Progress Note   Assessment      Lungs: are clear, encouraged to use incentive spirometer w/a  BS: active, no flatus yet, NPO x ice chips/meds   Urine: Unable to void. Bladder scanned for 640cc- straight cathed for 600cc.   Abdomen: Incision is clean dry intact, some bruising.  Pain: denied pain throughout night until this am. Now rating pain 8/10. Oxycodone given and ice to abdomen/incision.   Activity: BRPs w/SBA.  Dozed off and on throughout night but did not sleep well.

## 2019-12-13 NOTE — H&P
[facility]  [facility] - H&P     Kunal Sharma MRN# [id number]   Age: 69 year old YOB: 1950     HPI:    This is a 69-year-old gentleman who presents with painful bulging around his umbilicus.  The patient had been seen about a month ago by primary care who arranged for the patient to be evaluated by a surgeon for his hernia.  This was scheduled for January of next year.  The patient had not previously noticed that he had a hernia, but does state that his umbilicus has been a bit prominent.  At about noon today, the patient developed a painful swelling which has persisted.  He was seen at an urgent care, where an attempt was made to reduce it.  This was unsuccessful.  The patient was sent to the emergency room where another attempt was made to reduce it.  This was again unsuccessful.  CT scan revealed that the hernia contained bowel.  I made an attempt to reduce this in the emergency room, but was unable to get hernia to reduce.  There was relatively little acute tenderness or skin change.    History is obtained from the patient    Review Of Systems:  Respiratory: No shortness of breath, dyspnea on exertion, cough, or hemoptysis  Cardiovascular: negative  Gastrointestinal: as above  Genitourinary: negative and urinary tract infection diagnosed 3 days ago.  He was started on antibiotics at that time.  All remaining review of systems negative except as stated in HPI.    PMH:  The patient denies any significant past medical history.    PSH:  Patient denies any significant surgical history.    Allergies:  No Known Allergies    Home Medications:  The patient states that he does not take any regular medications.    Social History:  Social History     Tobacco Use     Smoking status: Not on file   Substance Use Topics     Alcohol use: Not on file     Drug use: Not on file       Family History:  No significant family history.    Physical Exam:  BP (!) 143/77   Pulse 76   Temp 97.8  F  (36.6  C) (Temporal)   Resp 18   Wt 114 kg (251 lb 5.2 oz)   SpO2 98%     General appearance: healthy, alert and mild distress.  Hydration: well hydrated  HEENT: normocephalic, atraumatic  Neck: no adenopathy  Lungs: normal and clear to auscultation  Heart: regular rate and rhythm and no murmurs, clicks, or gallops  Abdomen: rounded, normal bowel sounds.  There is prominent bulging around the umbilicus.  This is not reducible.  Skin: clear without rashes/lesions  Extremities: no gross deformities  Neuro: oriented to time & place, moves all extremities with normal strength, speech clear  Skin shows no evidence of jaundice.  Psychiatric-the patient shows good insight into his situation.    Labs Reviewed:  Lab Results   Component Value Date    WBC 10.4 12/12/2019     Lab Results   Component Value Date    HGB 15.4 12/12/2019     Lab Results   Component Value Date     12/12/2019       Last Basic Metabolic Panel:  Lab Results   Component Value Date     12/12/2019      Lab Results   Component Value Date    POTASSIUM 4.5 12/12/2019     Lab Results   Component Value Date    CHLORIDE 106 12/12/2019     Lab Results   Component Value Date    AMADEO 8.9 12/12/2019     Lab Results   Component Value Date    CO2 27 12/12/2019     Lab Results   Component Value Date    BUN 15 12/12/2019     Lab Results   Component Value Date    CR 1.07 12/12/2019     Lab Results   Component Value Date     12/12/2019         Radiology:  CT scan reveals a prominent ventral hernia containing bowel.    ASSESSMENT/PLAN:  This is a patient with an incarcerated ventral hernia.  At this point, I doubt the bowel is strangulated, but it is difficult to be certain.  I have recommended urgent repair of the hernia.  We will need to consider whether use of mesh is appropriate based on the condition of the bowel and the fact that he has a urinary tract infection being treated.  He understands that if his bowel has been compromised, he may  require bowel resection.  The risks, benefits, and alternatives have been discussed in detail.  All of the patient's questions have been answered.  They elect to proceed and we will go to the OR at the soonest availability.  Pre-operative antibiotics have been ordered.     Lenny Forte MD

## 2019-12-13 NOTE — PHARMACY-ADMISSION MEDICATION HISTORY
Admission medication history interview status for this patient is complete. See [facility] admission navigator for allergy information, prior to admission medications and immunization status.     Medication history interview source(s):Patient  Medication history resources (including written lists, pill bottles, clinic record):None  Primary pharmacy: [facility]     Changes made to PTA medication list:  Added: none  Deleted: none  Changed: bactrim    Actions taken by pharmacist (provider contacted, etc):None     Additional medication history information:None    Medication reconciliation/reorder completed by provider prior to medication history?  Yes     Do you take OTC medications (eg tylenol, ibuprofen, fish oil, eye/ear drops, etc)? No     For patients on insulin therapy: No    Prior to Admission medications    Medication Sig Last Dose Taking? Auth Provider   sulfamethoxazole-trimethoprim (BACTRIM DS/SEPTRA DS) 800-160 MG tablet Take 1 tablet by mouth 2 times daily  12/12/2019 at Unknown time Yes Reported, Patient

## 2019-12-14 VITALS
WEIGHT: 251.32 LBS | HEART RATE: 79 BPM | TEMPERATURE: 98.5 F | DIASTOLIC BLOOD PRESSURE: 81 MMHG | SYSTOLIC BLOOD PRESSURE: 159 MMHG | OXYGEN SATURATION: 91 % | RESPIRATION RATE: 16 BRPM

## 2019-12-14 PROCEDURE — 25000132 ZZH RX MED GY IP 250 OP 250 PS 637: Mod: GY | Performed by: SURGERY

## 2019-12-14 PROCEDURE — 25800030 ZZH RX IP 258 OP 636: Performed by: SURGERY

## 2019-12-14 RX ORDER — BISACODYL 10 MG
10 SUPPOSITORY, RECTAL RECTAL DAILY PRN
Status: DISCONTINUED | OUTPATIENT
Start: 2019-12-14 | End: 2019-12-14 | Stop reason: HOSPADM

## 2019-12-14 RX ORDER — OXYCODONE HYDROCHLORIDE 5 MG/1
5-10 TABLET ORAL EVERY 6 HOURS PRN
Qty: 10 TABLET | Refills: 0 | Status: SHIPPED | OUTPATIENT
Start: 2019-12-14 | End: 2023-12-05

## 2019-12-14 RX ADMIN — POTASSIUM CHLORIDE, DEXTROSE MONOHYDRATE AND SODIUM CHLORIDE: 150; 5; 450 INJECTION, SOLUTION INTRAVENOUS at 03:21

## 2019-12-14 RX ADMIN — ACETAMINOPHEN 975 MG: 325 TABLET, FILM COATED ORAL at 06:32

## 2019-12-14 RX ADMIN — OXYCODONE HYDROCHLORIDE 10 MG: 5 TABLET ORAL at 02:39

## 2019-12-14 RX ADMIN — ACETAMINOPHEN 975 MG: 325 TABLET, FILM COATED ORAL at 14:45

## 2019-12-14 RX ADMIN — SULFAMETHOXAZOLE AND TRIMETHOPRIM 1 TABLET: 800; 160 TABLET ORAL at 08:51

## 2019-12-14 ASSESSMENT — ACTIVITIES OF DAILY LIVING (ADL)
ADLS_ACUITY_SCORE: 13
ADLS_ACUITY_SCORE: 15

## 2019-12-14 NOTE — PLAN OF CARE
Shift: 14 Dec 2019    AOx4, VSS on RA. SBA while ambulating in room. No dizziness. Slow and steady gait. No nauasea. Voiding - had some hesitance prior to shift. Last BM 12 Dec. Advanced to regular diet-tolerating. Saline locked. Minimal pain managed w/scheduled tylenol. CMS intact. Lungs sounds CEB anterior and posterior. No visitors during shift. Skin intact.

## 2019-12-14 NOTE — PROGRESS NOTES
Pt is alert and oriented, lung sounds clear,VSS, up with SBA walker and gait. Tolerating clear liquid diet, passing flatus+ve bowel sounds . Pain controled with prn Oxy.Denies nausea and vomiting.Clear dressing clean dry and intact.Iv infusing, voiding adequate amounts.Plan to discharge home tomorrow.

## 2019-12-14 NOTE — PROGRESS NOTES
[facility]   General Surgery Progress Note         Assessment and Plan:   Assessment:   POD#2 s/p Procedure(s):  Repair of strangulated ventral hernia with obstruction   Primary repair, findings of numerous chronic interloop adhesions and infammation  Postoperative ileus, as expected  Afebrile  +UTI      Plan:   -Diet: full liquids and can ADAT  -Pain control: tylenol, oxycodone  -VTE prophylaxis: lovenox PCDs  -Continue bactrim for UTI  -Increase activity as tolerated  -Dispo: Possible discharge tonight vs tomorrow if tolerates diet advancement and continues to do well. Rx: oxycodone, Discharge instructions in chart and reviewed, RTC 1-3 weeks for regular PO visit.         Interval History:   Resting in bed. Comfortable. States he now doesn't have much pain. Tylenol controls his discomfort. He had oxycodone once last night. He has been passing quite a bit of flatus and feels ready to have a BM. He is tolerating clears. No nausea. He plans to do a lot of walking today.          Physical Exam:   Blood pressure 128/71, pulse 79, temperature 99.4  F (37.4  C), temperature source Oral, resp. rate 22, weight 114 kg (251 lb 5.2 oz), SpO2 93 %.    I/O last 3 completed shifts:  In: 2367 [I.V.:2367]  Out: 1470 [Urine:1400; Emesis/NG output:70]    Abdomen:   soft, obese, non-tender and +BS   Inc(s) - clean, dry, dermabond intact, +mild ecchymosis            Data:       Pantera Hong PA-C

## 2019-12-14 NOTE — DISCHARGE INSTRUCTIONS
"HOME CARE FOLLOWING UMBILICAL/VENTRAL HERNIA REPAIR  KYLEE Fernandez, YARI Olea, BAYRON Arias, GEO Thomson    DIET:  No restrictions.  Increased fluid intake is recommended. While taking pain medications, increase dietary fiber or add a fiber supplementation like Metamucil or Citrucel to help prevent constipation - a possible side effect of pain medications.    NAUSEA:  If nauseated from the anesthetic/pain meds; rest in bed, get up cautiously with assistance, and drink clear liquids (juice, tea, broth).    ACTIVITY:  Light Activity -- you may immediately be up and about as tolerated.  Driving -- you may drive when comfortable and off narcotic pain medications.  Light Work -- resume when comfortable off pain medications.  (If you can drive, you probably can work.)  Strenuous Work/Activity -- limit lifting to 20 pounds for 4 weeks.  Active Sports (running, biking, etc.) -- cautiously resume after 6 weeks.    INCISIONAL CARE:    If you have a dressing in place, keep clean and dry for 48 hours after surgery.  After this timeframe, you may replace the gauze daily if it becomes soiled.    You may remove the dressing and shower 48 hours after surgery.  Do not submerse incision in water for 1 week.    If you have a Dermabond dressing (a type of skin glue), you may shower immediately.    Sutures will absorb and need not be removed.    If present, leave the steri-strips (white paper tapes) in place for 14 days after surgery.    If present, leave Dermabond glue in place until it wears/flakes off.    Expect a variable amount of swelling/bruising/discoloration that may appear around or below the repair site.    Some numbness around the incision is common.    A lump/\"healing ridge\" under the incision is normal and will gradually resolve over the following 1-2 months.    DISCOMFORT:  Local anesthetic placed at surgery should provide relief for 4-8 hours.  Begin taking pain pills before discomfort is " severe.  Take the pain medication with some food, when possible, to minimize side effects.  Intermittent use of ice packs to the hernia repair site may help during the first 1-3 weeks after surgery.  Expect gradual improvement.    Over-the-counter anti-inflammatory medications (i.e. Ibuprofen/Advil/Motrin or Naprosyn/Aleve) may be used per package instructions in addition to or while tapering off the narcotic pain medications to decrease swelling and sensitivity at the repair site.  DO NOT TAKE these Anti-inflammatory medications if your primary physician has advised against doing so, or if you have acid reflux, ulcer, or bleeding disorder, or take blood-thinner medications.  Call your primary physician or the surgery office if you have medication questions.      RETURN APPOINTMENT:  Schedule a follow-up visit 2-3 weeks post-op.  Office Phone:  [phone]     CONTACT US IF THE FOLLOWING DEVELOPS:   1. A fever that is above 101     2. If there is a large amount of drainage, bleeding, or swelling.   3. Severe pain that is not relieved by your prescription.   4. Drainage that is thick, cloudy, yellow, green or white.   5. Any other questions not answered by  Frequently Asked Questions  sheet.      FREQUENTLY ASKED QUESTIONS:    Q:  How should my incision look?    A:  Normally your incision will appear slightly swollen with light redness directly along the incision itself as it heals.  It may feel like a bump or ridge as the healing/scarring happens, and over time (3-4 months) this bump or ridge feeling should slowly go away.  In general, clear or pink watery drainage can be normal at first as your incision heals, but should decrease over time.    Q:  How do I know if my incision is infected?  A:  Look at your incision for signs of infection, like redness around the incision spreading to surrounding skin, or drainage of cloudy or foul-smelling drainage.  If you feel warm, check your temperature to see if you are  running a fever.    **If any of these things occur, please notify the nurse at our office.  We may need you to come into the office for an incision check.      Q:  How do I take care of my incision?  A:  If you have a dressing in place - Starting the day after surgery, replace the dressing 1-2 times a day until there is no further drainage from the incision.  At that time, a dressing is no longer needed.  Try to minimize tape on the skin if irritation is occurring at the tape sites.  If you have significant irritation from tape on the skin, please call the office to discuss other method of dressing your incision.    Small pieces of tape called  steri-strips  may be present directly overlying your incision; these may be removed 10 days after surgery unless otherwise specified by your surgeon.  If these tapes start to loosen at the ends, you may trim them back until they fall off or are removed.    A:  If you had  Dermabond  tissue glue used as a dressing (this causes your incision to look shiny with a clear covering over it) - This type of dressing wears off with time and does not require more dressings over the top unless it is draining around the glue as it wears off.  Do not apply ointments or lotions over the incisions until the glue has completely worn off.    Q:  There is a piece of tape or a sticky  lead  still on my skin.  Can I remove this?  A:  Sometimes the sticky  leads  used for monitoring during surgery or for evaluation in the emergency department are not all removed while you are in the hospital.  These sometimes have a tab or metal dot on them.  You can easily remove these on your own, like taking off a band-aid.  If there is a gel substance under the  lead , simply wipe/clean it off with a washcloth or paper towel.      Q:  What can I do to minimize constipation (very hard stools, or lack of stools)?  A:  Stay well hydrated.  Increase your dietary fiber intake or take a fiber supplement -with plenty  of water.  Walk around frequently.  You may consider an over-the-counter stool-softener.  Your Pharmacist can assist you with choosing one that is stocked at your pharmacy.  Constipation is also one of the most common side effects of pain medication.  If you are using pain medication, be pro-active and try to PREVENT problems with constipation by taking the steps above BEFORE constipation becomes a problem.    Q:  What do I do if I need more pain medications?  A:  Call the office to receive refills.  Be aware that certain pain meds cannot be called into a pharmacy and actually require a paper prescription.  A change may be made in your pain med as you progress thru your recovery period or if you have side effects to certain meds.    --Pain meds are NOT refilled after 5pm on weekdays, and NOT AT ALL on the weekends, so please look ahead to prevent problems.      Q:  Why am I having a hard time sleeping now that I am at home?  A:  Many medications you receive while you are in the hospital can impact your sleep for a number of days after your surgery/hospitalization.  Decreased level of activity and naps during the day may also make sleeping at night difficult.  Try to minimize day-time naps, and get up frequently during the day to walk around your home during your recovery time.  Sleep aides may be of some help, but are not recommended for long-term use.      Q:  I am having some back discomfort.  What should I do?  A:  This may be related to certain positioning that was required for your surgery, extended periods of time in bed, or other changes in your overall activity level.  You may try ice, heat, acetaminophen, or ibuprofen to treat this temporarily.  Note that many pain medications have acetaminophen in them and would state this on the prescription bottle.  Be sure not to exceed the maximum of 4000mg per day of acetaminophen.     **If the pain you are having does not resolve, is severe, or is a flare of back  pain you have had on other occasions prior to surgery, please contact your primary physician for further recommendations or for an appointment to be examined at their office.    Q:  Why am I having headaches?  A:  Headaches can be caused by many things:  caffeine withdrawal, use of pain meds, dehydration, high blood pressure, lack of sleep, over-activity/exhaustion, flare-up of usual migraine headaches.  If you feel this is related to muscle tension (a band-like feeling around the head, or a pressure at the low-back of the head) you may try ice or heat to this area.  You may need to drink more fluids (try electrolyte drink like Gatorade), rest, or take your usual migraine medications.   **If your headaches do not resolve, worsen, are accompanied by other symptoms, or if your blood pressure is high, please call your primary physician for recommendation and/or examination.    Q:  I am unable to urinate.  What do I do?  A:  A small percentage of people can have difficulty urinating initially after surgery.  This includes being able to urinate only a very small amount at a time and feeling discomfort or pressure in the very low abdomen.  This is called  urinary retention , and is actually an urgent situation.  Proceed to your nearest Emergency department for evaluation (not an Urgent Care Center).  Sometimes the bladder does not work correctly after certain medications you receive during surgery, or related to certain procedures.  You may need to have a catheter placed until your bladder recovers.  When planning to go to an Emergency department, it may help to call the ER to let them know you are coming in for this problem after a surgery.  This may help you get in quicker to be evaluated.  **If you have symptoms of a urinary tract infection, please contact your primary physician for the proper evaluation and treatment.          If you have other questions, please call the office Monday thru Friday between 8am and 5pm  to discuss with the nurse or physician assistant.  #[phone]    There is a surgeon ON CALL on weekday evenings and over the weekend in case of urgent need only, and may be contacted at the same number.    If you are having an emergency, call 911 or proceed to your nearest emergency department.

## 2019-12-14 NOTE — PLAN OF CARE
Pt A/O x4.  VSS and afebrile.  Pain managed adequately with PO oxycodone (10mg).  CMS intact.  Dressing CDI.  Up SBA with walker and gait belt - remained in bed this shift with frequent repositioning d/t evening nausea.  Voiding adequately in urinal.  Tolerating clear liquid diet.  Pt was doing well and reporting no nausea during beginning of shift.  This nurse administered scheduled Lovenox subcutaneous injection around 1750 ;  about 15-20 minutes later pt began to complain of nausea.  After one emesis, nurse administered PO zofran (2024).  Pt had another episode of emesis about 15 minutes after but seemed to feel better.  At 2217 this nurse attempted to administer PO oxycodone (10mg) and PO Bactrim - the pt seemed to feel well enough at this time to take them and declined pre-treatment with compazine.  Within 10 minutes of med administration, pt again became nauseated and had a third emesis.  Unlikely that po medications were kept in stomach - IV compazine given immediately after and pt is resting fairly comfortably.  Plan is home on discharge when feeling well enough.  Will continue to monitor.

## 2019-12-15 NOTE — PLAN OF CARE
Reviewed discharge instructions and medications with patient. Questions answered. Patient discharged to home with ride from family, discharge instructions, medications (oxycodone), and belongings at this time.     Patient met all five criteria for discharge:  1. Afebrile  2. Pain managed with oral medications (tylenol and Oxycodone)  3. Tolerating diet (tolerated regular diet well at dinner.  Ate 100% of ordered food and denied nausea or increased pain).  4. Ambulating (stand by assist)  5. Voiding     Pt A/O x4.  VSS and afebrile.  Pain managed adequately with PRN PO tylenol and oxycodone.  CMS intact.  Dressing CDI.  Up SBA with gait belt.  Voiding adequately.  Tolerating regular diet well.  Discharged to home.

## 2019-12-16 LAB — INTERPRETATION ECG - MUSE: NORMAL

## 2019-12-17 LAB — COPATH REPORT: NORMAL

## 2019-12-18 NOTE — DISCHARGE SUMMARY
[facility]    Discharge Summary  Surgery    Date of Admission:  12/12/2019  Date of Discharge:  12/14/2019  Discharging Provider: Pantera Hong PA-C  Discharge Summary Note completed by: Mindi Meza PA-C on 12/18/2019  Date of Service: The patient was personally seen by Discharging Providers on the day of discharge.    Discharge Diagnoses   Active Problems:    Incarcerated hernia    Ventral hernia with obstruction but no gangrene      Procedure/Surgery Information   Procedure(s):  Repair of strangulated ventral hernia with obstruction   Surgeon(s) and Role:     * Lenny Forte MD - Primary     * Mindi Meza PA-C - Assisting     Specimens: ID Type Source Tests Collected by Time Destination   A : Ventral Hernia Contents  Tissue Hernia Sac SURGICAL PATHOLOGY EXAM Lenny Forte MD 12/12/2019  7:31 PM       Non-operative procedures: None performed       History of Present Illness   This is a 69-year-old gentleman who presented with several hours of painful bulging near his umbilicus.  The patient was diagnosed with a hernia fairly recently and had a surgical consultation scheduled for January of next year.  The patient suddenly developed pain at about noon today.  An attempt was made first in the urgent care and then in the emergency room to reduce this hernia.  CT scan showed that it contained loops of bowel.  There was sufficient fluid within these loops to suggest obstruction.  Urgent operation was recommended, and the procedure, along with its risks and complications, was discussed with the patient.  He agreed to proceed.    Hospital Course   Kunal Sharma was admitted on 12/12/2019.  The following problems were addressed during his hospitalization:  Patient Active Problem List   Diagnosis     Incarcerated hernia     Ventral hernia with obstruction but no gangrene       Ellie-operative antibiotic therapy included: None.  Post-operative pain control: was via IV until able to  tolerate PO intake and transitioned to PO pain meds.    Remarkable hospital course events: The patient recovered from surgery as anticipated. He had a postoperative ileus, as expected, which later resolved. The patient was also being treated for a UTI with Bactrim. Please see Hospitalist notes for further details if needed.  Kunal met all criteria for release on 12/14/2019.  He was afebrile, tolerating diet, pain controlled on PO meds, ambulating well, and had return of bowel function.    Medications discontinued or adjusted during this hospitalization: see discharge med list below.    Antibiotics prescribed at discharge: Bactrim, as previously prescribed from PCP    Imaging study follow up needs:   -No studies require specific follow-up    Discharge Instructions and Follow-Up:  Discharge diet: Regular   Discharge activity: No heavy lifting, pushing, pulling for 6 week(s)   Discharge follow-up: Follow up with Dr. Forte in 2-3 weeks   Wound/Incision care: Keep wound clean and dry       Mindi Meza PA-C      Discharge Disposition   Discharged to home   Condition at discharge: Stable    Pending Results   Final pathology results: Benign Hernia sac     Unresulted Labs Ordered in the Past 30 Days of this Admission     No orders found from 11/12/2019 to 12/13/2019.          Primary Care Physician   [facility]    Consultations This Hospital Stay   None    Discharge Orders   No discharge procedures on file.  Discharge Medications   Discharge Medication List as of 12/14/2019  6:48 PM      START taking these medications    Details   oxyCODONE (ROXICODONE) 5 MG tablet Take 1-2 tablets (5-10 mg) by mouth every 6 hours as needed for moderate to severe pain, Disp-10 tablet, R-0, E-Prescribe         CONTINUE these medications which have NOT CHANGED    Details   sulfamethoxazole-trimethoprim (BACTRIM DS/SEPTRA DS) 800-160 MG tablet Take 1 tablet by mouth 2 times daily , Historical           Allergies   No  Known Allergies  Data   Most Recent 3 CBC's:  Recent Labs   Lab Test 12/12/19  1621   WBC 10.4   HGB 15.4   MCV 94         Most Recent 3 BMP's:  Recent Labs   Lab Test 12/12/19  1621      POTASSIUM 4.5   CHLORIDE 106   CO2 27   BUN 15   CR 1.07   ANIONGAP 5   AMADEO 8.9   *     Most Recent 2 LFT's:No lab results found.  Most Recent INR's and Anticoagulation Dosing History:  Anticoagulation Dose History     Recent Dosing and Labs Latest Ref Rng & Units 12/12/2019    INR 0.86 - 1.14 0.98        Most Recent 3 Troponin's:No lab results found.  Most Recent Cholesterol Panel:No lab results found.  Most Recent 6 Bacteria Isolates From Any Culture (See EPIC Reports for Culture Details):No lab results found.  Most Recent TSH, T4 and A1c Labs:No lab results found.  Results for orders placed or performed during the hospital encounter of 12/12/19   CT Abdomen pelvis - oral contrast only    Narrative    EXAM: CT ABDOMEN PELVIS W/O CONTRAST  LOCATION: [facility]  DATE/TIME: 12/12/2019 5:00 PM    INDICATION:  incarcerated periumbilical hernia;  January. He states it had not been painful until this morning when it became tender. He went to urgent care where they spent approximately a half hour attempting to reduce it and used ice; they were unable   to reduce it and sent him here for further  evaluation.  COMPARISON: None.  TECHNIQUE: CT scan of the abdomen and pelvis was performed without IV contrast. Multiplanar reformats were obtained. Dose reduction techniques were used.  CONTRAST: None.    FINDINGS:   LOWER CHEST: Small hiatal hernia. Mild reflux.    HEPATOBILIARY: There is a single calcified 2 cm gallstone. Liver unremarkable.    PANCREAS: Normal.    SPLEEN: Normal.    ADRENAL GLANDS: Normal.    KIDNEYS/BLADDER: Normal.    BOWEL: Umbilical hernia contains a 2 short loops of of obstructed and inflamed small bowel. Dilated small bowel just left of the hernia presumably upstream. Mild mesenteric  edema. No free air. Normal caliber appendix. Mild distal colonic diverticulosis.    LYMPH NODES: Normal.    VASCULATURE: Unremarkable.    PELVIC ORGANS: Moderate prostate gland enlargement.    OTHER: None.    MUSCULOSKELETAL: No suspicious bone lesion. Severe degenerative change in the mid lumbar spine at L3-L4.      Impression    IMPRESSION:   1.  Mid small bowel obstruction secondary to incarcerated umbilical hernia. No free air.  2.  Small hiatal hernia with reflux.  3.  Prostate enlargement.

## 2023-07-25 ENCOUNTER — TRANSFERRED RECORDS (OUTPATIENT)
Dept: HEALTH INFORMATION MANAGEMENT | Facility: CLINIC | Age: 73
End: 2023-07-25
Payer: MEDICARE

## 2023-07-27 ENCOUNTER — TRANSCRIBE ORDERS (OUTPATIENT)
Dept: OTHER | Age: 73
End: 2023-07-27

## 2023-07-27 DIAGNOSIS — R97.20 ELEVATED PROSTATE SPECIFIC ANTIGEN (PSA): Primary | ICD-10-CM

## 2023-07-28 ENCOUNTER — TELEPHONE (OUTPATIENT)
Dept: UROLOGY | Facility: CLINIC | Age: 73
End: 2023-07-28

## 2023-07-28 NOTE — TELEPHONE ENCOUNTER
This encounter is being sent to inform the clinic that this patient has a referral from sheree Lewis for the diagnoses of elevated PSA and has requested that this patient be seen within 1-2 weeks and/or with n/a.  Based on the availability of our provider(s), we are unable to accommodate this request.    Were all sites offered this patient?  Yes    Does scheduling algorithm request to schedule next available?  Patient has been scheduled for the first available opening with Coy on 9/21/2023.  We have informed the patient that the clinic will review their referral and reach out if a sooner appointment is medically necessary.

## 2023-07-31 ENCOUNTER — VIRTUAL VISIT (OUTPATIENT)
Dept: UROLOGY | Facility: CLINIC | Age: 73
End: 2023-07-31
Payer: MEDICARE

## 2023-07-31 VITALS — WEIGHT: 265 LBS | BODY MASS INDEX: 37.1 KG/M2 | HEIGHT: 71 IN

## 2023-07-31 DIAGNOSIS — R97.20 ELEVATED PROSTATE SPECIFIC ANTIGEN (PSA): ICD-10-CM

## 2023-07-31 PROCEDURE — 99203 OFFICE O/P NEW LOW 30 MIN: CPT | Mod: VID | Performed by: UROLOGY

## 2023-07-31 ASSESSMENT — PAIN SCALES - GENERAL: PAINLEVEL: NO PAIN (0)

## 2023-07-31 NOTE — PROGRESS NOTES
** Send link to cell phone    Kunal is a 73 year old who is being evaluated via a billable video visit.      How would you like to obtain your AVS? MyChart  If the video visit is dropped, the invitation should be resent by: Text to cell phone: [phone]  Will anyone else be joining your video visit? [facility]  [address] Office[address]  [address]  [place] [address]       CHIEF COMPLAINT:  Elevated PSA    HISTORY:   I was asked by Dr. Lewis ([facility] clinic) to see this 73-year-old who presents with an elevated PSA.  We do not have his records available but the patient self reports that he had a PSA of 9.2 recently and an annual physical.  He does not think he had the PSA checked ever before that.  He says his father did have prostate cancer but his father ultimately  of lung cancer.  He has no urinary symptoms or complaints.      PAST MEDICAL HISTORY: History reviewed. No pertinent past medical history.    PAST SURGICAL HISTORY:   Past Surgical History:   Procedure Laterality Date    HERNIORRHAPHY INCISIONAL (LOCATION) N/A 2019    Procedure: Repair of strangulated ventral hernia with obstruction;  Surgeon: Lenny Forte MD;  Location: [facility] OR       FAMILY HISTORY: History reviewed. No pertinent family history.    SOCIAL HISTORY:   Social History     Tobacco Use    Smoking status: Never    Smokeless tobacco: Never   Substance Use Topics    Alcohol use: Not Currently        No Known Allergies      Current Outpatient Medications:     metFORMIN (GLUCOPHAGE) 500 MG tablet, Take 500 mg by mouth daily (with breakfast), Disp: , Rfl:     oxyCODONE (ROXICODONE) 5 MG tablet, Take 1-2 tablets (5-10 mg) by mouth every 6 hours as needed for moderate to severe pain (Patient not taking: Reported on 2023), Disp: 10 tablet, Rfl: 0    Review Of Systems:  Skin: No rash, pruritis, or skin pigmentation  Eyes: No changes in vision  Ears/Nose/Throat: No changes in hearing, no  nosebleeds  Respiratory: No shortness of breath, dyspnea on exertion, cough, or hemoptysis  Cardiovascular: No chest pain or palpitations  Gastrointestinal: No diarrhea or constipation. No abdominal pain. No hematochezia  Genitourinary: see HPI  Musculoskeletal: No pain or swelling of joints, normal range of motion  Neurologic: No weakness or tremors  Psychiatric: No recent changes in memory or mood  Hematologic/Lymphatic/Immunologic: No easy bruising or enlarged lymph nodes  Endocrine: No weight gain or loss      PHYSICAL EXAM:    General: Alert and oriented to time, place, and self. In NAD   HEENT: Head AT/NC, EOMI, CN Grossly intact   Lungs: no respiratory distress, or pursed lip breathing   Heart: No obvious jugular venous distension present   Musculoskeltal: Normal movements. Normal appearing musculature  Skin: no suspicious lesions or rashes   Neuro: Alert, oriented, speech and mentation normal; moving all 4 extremities equally.   Psych: affect and mood normal      PSA: 9.2    UA RESULTS:  No results for input(s): COLOR, APPEARANCE, URINEGLC, URINEBILI, URINEKETONE, SG, UBLD, URINEPH, PROTEIN, UROBILINOGEN, NITRITE, LEUKEST, RBCU, WBCU in the last 16653 hours.    Bladder Scan:     Other Labs:      Imaging Studies: None      CLINICAL IMPRESSION:   Elevated PSA    PLAN:   He has a self-reported elevated PSA from an annual physical recently.  He has had no other PSA values.  Before proceeding with any more sophisticated testing I recommended that we simply recheck the PSA and examined him in the office.  He agreed to the plan.    We will have him come back to the office for PSA, urinalysis, bladder scan, and digital rectal examination.      Elton Melton MD      Video-Visit Details    Type of service:  Video Visit   Video Start Time: 2:30 PM  Video End Time:2:42 PM    Originating Location (pt. Location): Home    Distant Location (provider location):  On-site  Platform used for Video Visit: Kun

## 2023-07-31 NOTE — LETTER
2023       RE: Kunal Sharma  [address]  [facility] [address]     Dear Colleague,    Thank you for referring your patient, Kunal Sharma, to the [facility] [place] at [facility]. Please see a copy of my visit note below.    ** Send link to cell phone    Kunal is a 73 year old who is being evaluated via a billable video visit.      How would you like to obtain your AVS? MyChart  If the video visit is dropped, the invitation should be resent by: Text to cell phone: [phone]  Will anyone else be joining your video visit? No        [facility]  Main Office: [address]  [address]  [place] [address]       CHIEF COMPLAINT:  Elevated PSA    HISTORY:   I was asked by Dr. Lewis ([facility] clinic) to see this 73-year-old who presents with an elevated PSA.  We do not have his records available but the patient self reports that he had a PSA of 9.2 recently and an annual physical.  He does not think he had the PSA checked ever before that.  He says his father did have prostate cancer but his father ultimately  of lung cancer.  He has no urinary symptoms or complaints.      PAST MEDICAL HISTORY: History reviewed. No pertinent past medical history.    PAST SURGICAL HISTORY:   Past Surgical History:   Procedure Laterality Date    HERNIORRHAPHY INCISIONAL (LOCATION) N/A 2019    Procedure: Repair of strangulated ventral hernia with obstruction;  Surgeon: Lenny Forte MD;  Location: [facility] OR       FAMILY HISTORY: History reviewed. No pertinent family history.    SOCIAL HISTORY:   Social History     Tobacco Use    Smoking status: Never    Smokeless tobacco: Never   Substance Use Topics    Alcohol use: Not Currently        No Known Allergies      Current Outpatient Medications:     metFORMIN (GLUCOPHAGE) 500 MG tablet, Take 500 mg by mouth daily (with breakfast), Disp: , Rfl:     oxyCODONE (ROXICODONE) 5 MG tablet, Take  1-2 tablets (5-10 mg) by mouth every 6 hours as needed for moderate to severe pain (Patient not taking: Reported on 7/31/2023), Disp: 10 tablet, Rfl: 0    Review Of Systems:  Skin: No rash, pruritis, or skin pigmentation  Eyes: No changes in vision  Ears/Nose/Throat: No changes in hearing, no nosebleeds  Respiratory: No shortness of breath, dyspnea on exertion, cough, or hemoptysis  Cardiovascular: No chest pain or palpitations  Gastrointestinal: No diarrhea or constipation. No abdominal pain. No hematochezia  Genitourinary: see HPI  Musculoskeletal: No pain or swelling of joints, normal range of motion  Neurologic: No weakness or tremors  Psychiatric: No recent changes in memory or mood  Hematologic/Lymphatic/Immunologic: No easy bruising or enlarged lymph nodes  Endocrine: No weight gain or loss      PHYSICAL EXAM:    General: Alert and oriented to time, place, and self. In NAD   HEENT: Head AT/NC, EOMI, CN Grossly intact   Lungs: no respiratory distress, or pursed lip breathing   Heart: No obvious jugular venous distension present   Musculoskeltal: Normal movements. Normal appearing musculature  Skin: no suspicious lesions or rashes   Neuro: Alert, oriented, speech and mentation normal; moving all 4 extremities equally.   Psych: affect and mood normal      PSA: 9.2    UA RESULTS:  No results for input(s): COLOR, APPEARANCE, URINEGLC, URINEBILI, URINEKETONE, SG, UBLD, URINEPH, PROTEIN, UROBILINOGEN, NITRITE, LEUKEST, RBCU, WBCU in the last 79418 hours.    Bladder Scan:     Other Labs:      Imaging Studies: None      CLINICAL IMPRESSION:   Elevated PSA    PLAN:   He has a self-reported elevated PSA from an annual physical recently.  He has had no other PSA values.  Before proceeding with any more sophisticated testing I recommended that we simply recheck the PSA and examined him in the office.  He agreed to the plan.    We will have him come back to the office for PSA, urinalysis, bladder scan, and digital rectal  examination.      Elton Melton MD      Video-Visit Details    Type of service:  Video Visit   Video Start Time: 2:30 PM  Video End Time:2:42 PM    Originating Location (pt. Location): Home    Distant Location (provider location):  On-site  Platform used for Video Visit: Kun

## 2023-08-03 ENCOUNTER — TELEPHONE (OUTPATIENT)
Dept: UROLOGY | Facility: CLINIC | Age: 73
End: 2023-08-03
Payer: MEDICARE

## 2023-08-03 NOTE — TELEPHONE ENCOUNTER
----- Message from Patricia Justice sent at 2023 10:37 AM CDT -----  Regardin week follow up  Return in about 5 weeks (around 2023) for PSA, UA, and bladder scan.    SDB  23

## 2023-08-08 ENCOUNTER — LAB REQUISITION (OUTPATIENT)
Dept: LAB | Facility: CLINIC | Age: 73
End: 2023-08-08
Payer: MEDICARE

## 2023-08-08 DIAGNOSIS — N30.01 ACUTE CYSTITIS WITH HEMATURIA: ICD-10-CM

## 2023-08-08 PROCEDURE — 87086 URINE CULTURE/COLONY COUNT: CPT | Mod: ORL | Performed by: STUDENT IN AN ORGANIZED HEALTH CARE EDUCATION/TRAINING PROGRAM

## 2023-08-09 LAB — BACTERIA UR CULT: ABNORMAL

## 2023-08-10 ENCOUNTER — TELEPHONE (OUTPATIENT)
Dept: UROLOGY | Facility: CLINIC | Age: 73
End: 2023-08-10

## 2023-08-10 NOTE — TELEPHONE ENCOUNTER
[facility] Call Center    Phone Message    May a detailed message be left on voicemail: yes     Reason for Call: Other: Pt scheduled follow up appt with Geronimo. First available is in Jan. Per chart note Geronimo is wanting to see pt in Sept also pt is requesting to be seen sooner as well. Please advise and call pt      Action Taken: Message routed to:  Other: Uro    Travel Screening: Not Applicable

## 2023-08-11 ENCOUNTER — TRANSCRIBE ORDERS (OUTPATIENT)
Dept: OTHER | Age: 73
End: 2023-08-11

## 2023-08-11 DIAGNOSIS — N39.0 RECURRENT UTI: Primary | ICD-10-CM

## 2023-09-08 ENCOUNTER — OFFICE VISIT (OUTPATIENT)
Dept: UROLOGY | Facility: CLINIC | Age: 73
End: 2023-09-08
Payer: MEDICARE

## 2023-09-08 VITALS
SYSTOLIC BLOOD PRESSURE: 165 MMHG | WEIGHT: 257 LBS | BODY MASS INDEX: 35.98 KG/M2 | HEIGHT: 71 IN | DIASTOLIC BLOOD PRESSURE: 110 MMHG

## 2023-09-08 DIAGNOSIS — N40.0 ENLARGED PROSTATE: ICD-10-CM

## 2023-09-08 DIAGNOSIS — R39.89 SUSPECTED UTI: ICD-10-CM

## 2023-09-08 DIAGNOSIS — N39.0 RECURRENT UTI: ICD-10-CM

## 2023-09-08 DIAGNOSIS — R97.20 ELEVATED PROSTATE SPECIFIC ANTIGEN (PSA): Primary | ICD-10-CM

## 2023-09-08 DIAGNOSIS — R33.9 URINARY RETENTION: ICD-10-CM

## 2023-09-08 LAB
ALBUMIN UR-MCNC: 30 MG/DL
APPEARANCE UR: ABNORMAL
BILIRUB UR QL STRIP: NEGATIVE
COLOR UR AUTO: YELLOW
GLUCOSE UR STRIP-MCNC: NEGATIVE MG/DL
HGB UR QL STRIP: ABNORMAL
KETONES UR STRIP-MCNC: NEGATIVE MG/DL
LEUKOCYTE ESTERASE UR QL STRIP: ABNORMAL
NITRATE UR QL: NEGATIVE
PH UR STRIP: 5.5 [PH] (ref 5–7)
RESIDUAL VOLUME (RV) (EXTERNAL): 745
SP GR UR STRIP: 1.01 (ref 1–1.03)
UROBILINOGEN UR STRIP-ACNC: 0.2 E.U./DL

## 2023-09-08 PROCEDURE — 99214 OFFICE O/P EST MOD 30 MIN: CPT | Mod: 25 | Performed by: UROLOGY

## 2023-09-08 PROCEDURE — 51798 US URINE CAPACITY MEASURE: CPT | Performed by: UROLOGY

## 2023-09-08 PROCEDURE — 51702 INSERT TEMP BLADDER CATH: CPT | Performed by: UROLOGY

## 2023-09-08 PROCEDURE — 81003 URINALYSIS AUTO W/O SCOPE: CPT | Mod: QW | Performed by: UROLOGY

## 2023-09-08 PROCEDURE — 87086 URINE CULTURE/COLONY COUNT: CPT | Performed by: UROLOGY

## 2023-09-08 RX ORDER — LISINOPRIL 10 MG/1
TABLET ORAL
COMMUNITY
Start: 2023-08-02 | End: 2023-12-05

## 2023-09-08 RX ORDER — TAMSULOSIN HYDROCHLORIDE 0.4 MG/1
CAPSULE ORAL
Status: ON HOLD | COMMUNITY
Start: 2023-08-12 | End: 2023-12-15

## 2023-09-08 RX ORDER — LIDOCAINE HYDROCHLORIDE 20 MG/ML
JELLY TOPICAL ONCE
Status: COMPLETED | OUTPATIENT
Start: 2023-09-08 | End: 2023-09-08

## 2023-09-08 RX ORDER — HYDROCHLOROTHIAZIDE 12.5 MG/1
CAPSULE ORAL
COMMUNITY
Start: 2023-08-17 | End: 2023-12-05

## 2023-09-08 RX ORDER — SILDENAFIL 25 MG/1
25 TABLET, FILM COATED ORAL DAILY PRN
COMMUNITY
Start: 2023-06-28

## 2023-09-08 RX ORDER — SULFAMETHOXAZOLE/TRIMETHOPRIM 800-160 MG
1 TABLET ORAL 2 TIMES DAILY
Qty: 20 TABLET | Refills: 0 | Status: SHIPPED | OUTPATIENT
Start: 2023-09-08 | End: 2023-09-18

## 2023-09-08 RX ORDER — ATORVASTATIN CALCIUM 40 MG/1
40 TABLET, FILM COATED ORAL AT BEDTIME
COMMUNITY
Start: 2023-08-02

## 2023-09-08 RX ORDER — AMLODIPINE BESYLATE 10 MG/1
1 TABLET ORAL DAILY
COMMUNITY
Start: 2023-08-30

## 2023-09-08 RX ORDER — METOPROLOL TARTRATE 25 MG/1
TABLET, FILM COATED ORAL
COMMUNITY
Start: 2023-07-29

## 2023-09-08 RX ADMIN — LIDOCAINE HYDROCHLORIDE: 20 JELLY TOPICAL at 09:09

## 2023-09-08 ASSESSMENT — PAIN SCALES - GENERAL: PAINLEVEL: NO PAIN (0)

## 2023-09-08 NOTE — NURSING NOTE
Chief Complaint   Patient presents with    Elevated PSA     PVR: 745 mL by bladder scan        5mL 2% lidocaine hydrochloride Urojet instilled into urethra.    NDC# [address]-5  Lot #: [id number]  Expiration Date:  11/24    Using sterile field technique a sterile, well lubricated 16 Wolof Dumont coude catheter was gently inserted with ease x 1 attempt.  The balloon was filled with 10 ml sterile water.  No discomfort was voiced by the patient.  Cloudy, yellow urine return from the catheter was noted, 775 mL drained, with more continuing to drain after tubing was attached.  Sterile tubing and drainage bag were attached to the catheter and secured to the thigh. Patient to follow up next week for TOV.      Phoebe Dillard, EMT

## 2023-09-08 NOTE — PROGRESS NOTES
Office Visit Note  [facility]  [phone]    UROLOGIC DIAGNOSES:   Elevated PSA    CURRENT INTERVENTIONS:       HISTORY:   Kunal had his PSA rechecked earlier this week and is unchanged at 9.2.  However, about a month ago he had a urinary tract infection.  This was first treated with ciprofloxacin but then was changed to Bactrim antibiotics after cultures came back.  He says he felt better after 1 week of Bactrim antibiotics.  However, his symptoms have recurred in the past week.  He thinks he may have another infection again.  He says at baseline he gets up twice a night and has no symptoms during the day.  However, recently he has had worsening urinary frequency and incontinence as well as nocturia.  He has had no fevers chills nausea or vomiting.      PAST MEDICAL HISTORY: No past medical history on file.    PAST SURGICAL HISTORY:   Past Surgical History:   Procedure Laterality Date    HERNIORRHAPHY INCISIONAL (LOCATION) N/A 12/12/2019    Procedure: Repair of strangulated ventral hernia with obstruction;  Surgeon: Lenny Forte MD;  Location:  OR       FAMILY HISTORY: No family history on file.    SOCIAL HISTORY:   Social History     Socioeconomic History    Marital status:      Spouse name: None    Number of children: None    Years of education: None    Highest education level: None   Tobacco Use    Smoking status: Never    Smokeless tobacco: Never   Substance and Sexual Activity    Alcohol use: Not Currently    Drug use: Not Currently       Review Of Systems:  Skin: No rash, pruritis, or skin pigmentation  Eyes: No changes in vision  Ears/Nose/Throat: No changes in hearing, no nosebleeds  Respiratory: No shortness of breath, dyspnea on exertion, cough, or hemoptysis  Cardiovascular: No chest pain or palpitations  Gastrointestinal: No diarrhea or constipation. No abdominal pain. No hematochezia  Genitourinary: see HPI  Musculoskeletal: No pain or swelling of joints, normal range of  "motion  Neurologic: No weakness or tremors  Psychiatric: No recent changes in memory or mood  Hematologic/Lymphatic/Immunologic: No easy bruising or enlarged lymph nodes  Endocrine: No weight gain or loss      PHYSICAL EXAM:    BP (!) 165/110   Ht 1.791 m (5' 10.5\")   Wt 116.6 kg (257 lb)   BMI 36.35 kg/m      Constitutional: Well developed. Conversant and in no acute distress  Eyes: Anicteric sclera, conjunctiva clear, normal extraocular movements  ENT: Normocephalic and atraumatic,   Skin: Warm and dry. No rashes or lesions  Cardiac: No peripheral edema  Back/Flank: Not done  CNS/PNS: Normal musculature and movements, moves all extremities normally  Respiratory: Normal non-labored breathing  Abdomen: Soft nontender and nondistended  Peripheral Vascular: No peripheral edema  Mental Status/Psych: Alert and Oriented x 3. Normal mood and affect    Penis: Normal  Scrotal skin: Normal, no lesions  Testicles: Normal to palpation bilaterally  Epididymis: Normal to palpation bilaterally  Lymphatic: Normal inguinal lymph nodes    Digital Rectal Exam: The prostate is moderately enlarged, benign and symmetric to palpation    Cystoscopy: Not done    Imaging: None    Urinalysis: UA RESULTS:  No results for input(s): COLOR, APPEARANCE, URINEGLC, URINEBILI, URINEKETONE, SG, UBLD, URINEPH, PROTEIN, UROBILINOGEN, NITRITE, LEUKEST, RBCU, WBCU in the last 64914 hours.    PSA: 9.2    Post Void Residual: 745mL    Other labs: None today      IMPRESSION:  Elevated PSA  Enlarged prostate  Urinary retention  Possible urinary tract infection    PLAN:  We first discussed the PSA.  His PSA is likely a false elevation due to urinary tract infection.  I counseled him not to be concerned about the PSA at this time and we will plan to follow-up with another PSA in the future when he is not infected.    We discussed the urinary tract infection and urinary retention.  His urinary tract infection appears to have recurred.  His poor bladder " emptying is likely contributing to this.  I recommended that I treat him with a longer course of Bactrim.  I also recommended a period of catheterization to help empty the bladder and help eradicate the infection.  I discussed catheterization with him and he agreed.    We will start Bactrim antibiotics today.  He will return to clinic on Wednesday next week for catheter removal and trial of void.      Elton Melton M.D.

## 2023-09-08 NOTE — LETTER
9/8/2023       RE: Kunal Sharma  [address] [place] [id number]     Dear Colleague,    Thank you for referring your patient, Kunal Sharma, to the [facility] [place] at [facility]. Please see a copy of my visit note below.    Office Visit Note  [facility]  [phone]    UROLOGIC DIAGNOSES:   Elevated PSA    CURRENT INTERVENTIONS:       HISTORY:   Kunal had his PSA rechecked earlier this week and is unchanged at 9.2.  However, about a month ago he had a urinary tract infection.  This was first treated with ciprofloxacin but then was changed to Bactrim antibiotics after cultures came back.  He says he felt better after 1 week of Bactrim antibiotics.  However, his symptoms have recurred in the past week.  He thinks he may have another infection again.  He says at baseline he gets up twice a night and has no symptoms during the day.  However, recently he has had worsening urinary frequency and incontinence as well as nocturia.  He has had no fevers chills nausea or vomiting.      PAST MEDICAL HISTORY: No past medical history on file.    PAST SURGICAL HISTORY:   Past Surgical History:   Procedure Laterality Date    HERNIORRHAPHY INCISIONAL (LOCATION) N/A 12/12/2019    Procedure: Repair of strangulated ventral hernia with obstruction;  Surgeon: Lenny Forte MD;  Location:  OR       FAMILY HISTORY: No family history on file.    SOCIAL HISTORY:   Social History     Socioeconomic History    Marital status:      Spouse name: None    Number of children: None    Years of education: None    Highest education level: None   Tobacco Use    Smoking status: Never    Smokeless tobacco: Never   Substance and Sexual Activity    Alcohol use: Not Currently    Drug use: Not Currently       Review Of Systems:  Skin: No rash, pruritis, or skin pigmentation  Eyes: No changes in vision  Ears/Nose/Throat: No changes in hearing, no  "nosebleeds  Respiratory: No shortness of breath, dyspnea on exertion, cough, or hemoptysis  Cardiovascular: No chest pain or palpitations  Gastrointestinal: No diarrhea or constipation. No abdominal pain. No hematochezia  Genitourinary: see HPI  Musculoskeletal: No pain or swelling of joints, normal range of motion  Neurologic: No weakness or tremors  Psychiatric: No recent changes in memory or mood  Hematologic/Lymphatic/Immunologic: No easy bruising or enlarged lymph nodes  Endocrine: No weight gain or loss      PHYSICAL EXAM:    BP (!) 165/110   Ht 1.791 m (5' 10.5\")   Wt 116.6 kg (257 lb)   BMI 36.35 kg/m      Constitutional: Well developed. Conversant and in no acute distress  Eyes: Anicteric sclera, conjunctiva clear, normal extraocular movements  ENT: Normocephalic and atraumatic,   Skin: Warm and dry. No rashes or lesions  Cardiac: No peripheral edema  Back/Flank: Not done  CNS/PNS: Normal musculature and movements, moves all extremities normally  Respiratory: Normal non-labored breathing  Abdomen: Soft nontender and nondistended  Peripheral Vascular: No peripheral edema  Mental Status/Psych: Alert and Oriented x 3. Normal mood and affect    Penis: Normal  Scrotal skin: Normal, no lesions  Testicles: Normal to palpation bilaterally  Epididymis: Normal to palpation bilaterally  Lymphatic: Normal inguinal lymph nodes    Digital Rectal Exam: The prostate is moderately enlarged, benign and symmetric to palpation    Cystoscopy: Not done    Imaging: None    Urinalysis: UA RESULTS:  No results for input(s): COLOR, APPEARANCE, URINEGLC, URINEBILI, URINEKETONE, SG, UBLD, URINEPH, PROTEIN, UROBILINOGEN, NITRITE, LEUKEST, RBCU, WBCU in the last 10386 hours.    PSA: 9.2    Post Void Residual: 745mL    Other labs: None today      IMPRESSION:  Elevated PSA  Enlarged prostate  Urinary retention  Possible urinary tract infection    PLAN:  We first discussed the PSA.  His PSA is likely a false elevation due to urinary " tract infection.  I counseled him not to be concerned about the PSA at this time and we will plan to follow-up with another PSA in the future when he is not infected.    We discussed the urinary tract infection and urinary retention.  His urinary tract infection appears to have recurred.  His poor bladder emptying is likely contributing to this.  I recommended that I treat him with a longer course of Bactrim.  I also recommended a period of catheterization to help empty the bladder and help eradicate the infection.  I discussed catheterization with him and he agreed.    We will start Bactrim antibiotics today.  He will return to clinic on Wednesday next week for catheter removal and trial of void.      Elton Melton M.D.

## 2023-09-10 LAB — BACTERIA UR CULT: NORMAL

## 2023-09-13 ENCOUNTER — OFFICE VISIT (OUTPATIENT)
Dept: UROLOGY | Facility: CLINIC | Age: 73
End: 2023-09-13
Payer: MEDICARE

## 2023-09-13 VITALS
DIASTOLIC BLOOD PRESSURE: 80 MMHG | WEIGHT: 255 LBS | SYSTOLIC BLOOD PRESSURE: 144 MMHG | HEIGHT: 71 IN | BODY MASS INDEX: 35.7 KG/M2

## 2023-09-13 DIAGNOSIS — R97.20 ELEVATED PSA: Primary | ICD-10-CM

## 2023-09-13 DIAGNOSIS — N39.0 RECURRENT UTI: ICD-10-CM

## 2023-09-13 DIAGNOSIS — R97.20 ELEVATED PROSTATE SPECIFIC ANTIGEN (PSA): ICD-10-CM

## 2023-09-13 DIAGNOSIS — R33.9 URINARY RETENTION: ICD-10-CM

## 2023-09-13 PROCEDURE — 99214 OFFICE O/P EST MOD 30 MIN: CPT | Performed by: UROLOGY

## 2023-09-13 ASSESSMENT — PAIN SCALES - GENERAL: PAINLEVEL: NO PAIN (0)

## 2023-09-13 NOTE — NURSING NOTE
Chief Complaint   Patient presents with    Elevated PSA    Urinary Retention     Patient's catheter was disconnected from leg bag, a sterile tip syringe was attached to catheter end, and 100 mL of sterile water was instilled via gravity into the bladder, when pt stated he had a strong urge to urinate.  Removed Dumont catheter after deflating balloon completely.    Patient voided 0 mL     Patient allowed to go home without catheter.    Pt currently taking Bactrim    Patient was instructed to call our office during office hours if unable to urinate, or to go to the ER if not during office hours.    Phoebe Dillard, EMT

## 2023-09-13 NOTE — LETTER
9/13/2023       RE: Kunal Sharma  [address] [place] [id number]     Dear Colleague,    Thank you for referring your patient, Kunal Sharma, to the [facility] [place] at [facility]. Please see a copy of my visit note below.    Office Visit Note  [facility]  [phone]    UROLOGIC DIAGNOSES:   Enlarged prostate  Urinary retention  Elevated PSA  History of urinary tract infection    CURRENT INTERVENTIONS:   Flomax    HISTORY:   Kunal returns to clinic today after being treated for urinary tract infection and catheterized for the past 5 days.  His urine culture actually ended up growing out mixed urogenital danielle. He has felt well and his urine has cleared      PAST MEDICAL HISTORY: No past medical history on file.    PAST SURGICAL HISTORY:   Past Surgical History:   Procedure Laterality Date    HERNIORRHAPHY INCISIONAL (LOCATION) N/A 12/12/2019    Procedure: Repair of strangulated ventral hernia with obstruction;  Surgeon: Lenny Forte MD;  Location:  OR       FAMILY HISTORY: No family history on file.    SOCIAL HISTORY:   Social History     Socioeconomic History    Marital status:      Spouse name: None    Number of children: None    Years of education: None    Highest education level: None   Tobacco Use    Smoking status: Never    Smokeless tobacco: Never   Substance and Sexual Activity    Alcohol use: Not Currently    Drug use: Not Currently       Review Of Systems:  Skin: No rash, pruritis, or skin pigmentation  Eyes: No changes in vision  Ears/Nose/Throat: No changes in hearing, no nosebleeds  Respiratory: No shortness of breath, dyspnea on exertion, cough, or hemoptysis  Cardiovascular: No chest pain or palpitations  Gastrointestinal: No diarrhea or constipation. No abdominal pain. No hematochezia  Genitourinary: see HPI  Musculoskeletal: No pain or swelling of joints, normal range of  "motion  Neurologic: No weakness or tremors  Psychiatric: No recent changes in memory or mood  Hematologic/Lymphatic/Immunologic: No easy bruising or enlarged lymph nodes  Endocrine: No weight gain or loss      PHYSICAL EXAM:    BP (!) 144/80   Ht 1.791 m (5' 10.5\")   Wt 115.7 kg (255 lb)   BMI 36.07 kg/m      Constitutional: Well developed. Conversant and in no acute distress  Eyes: Anicteric sclera, conjunctiva clear, normal extraocular movements  ENT: Normocephalic and atraumatic,   Skin: Warm and dry. No rashes or lesions  Cardiac: No peripheral edema  Back/Flank: Not done  CNS/PNS: Normal musculature and movements, moves all extremities normally  Respiratory: Normal non-labored breathing  Abdomen: Soft nontender and nondistended  Peripheral Vascular: No peripheral edema  Mental Status/Psych: Alert and Oriented x 3. Normal mood and affect    Penis: Not done  Scrotal Skin: Not done  Testicles: Not done  Epididymis: Not done  Digital Rectal Exam:     Cystoscopy: Not done    Imaging: None    Urinalysis: UA RESULTS:  Recent Labs   Lab Test 09/08/23  0804   COLOR Yellow   APPEARANCE Cloudy*   URINEGLC Negative   URINEBILI Negative   URINEKETONE Negative   SG 1.010   UBLD Small*   URINEPH 5.5   PROTEIN 30*   UROBILINOGEN 0.2   NITRITE Negative   LEUKEST Large*       PSA:     Post Void Residual:     Other labs: None today      IMPRESSION:  Enlarged prostate  Elevated PSA  History of retention  History of urinary tract infection    PLAN:  We attempted to feel his bladder with water through the catheter today but he had a strong urge to urinate after only 100 mL of instillation.  We removed the catheter.  He was unable to urinate.    I recommended that he drink large amounts of water over lunch today and that he come back to clinic if he is unable to urinate today.  Otherwise, if he is able to urinate I recommended that he continue taking the Flomax daily.  I recommended that he finish up his course of Bactrim " antibiotics.  I also recommended that he see me again in 1 month to recheck PSA urinalysis and bladder scan.      Elton Melton M.D.

## 2023-09-13 NOTE — PROGRESS NOTES
"Office Visit Note  [facility]  [phone]    UROLOGIC DIAGNOSES:   Enlarged prostate  Urinary retention  Elevated PSA  History of urinary tract infection    CURRENT INTERVENTIONS:   Flomax    HISTORY:   Kunal returns to clinic today after being treated for urinary tract infection and catheterized for the past 5 days.  His urine culture actually ended up growing out mixed urogenital danielle. He has felt well and his urine has cleared      PAST MEDICAL HISTORY: No past medical history on file.    PAST SURGICAL HISTORY:   Past Surgical History:   Procedure Laterality Date    HERNIORRHAPHY INCISIONAL (LOCATION) N/A 12/12/2019    Procedure: Repair of strangulated ventral hernia with obstruction;  Surgeon: Lenny Forte MD;  Location: [facility] OR       FAMILY HISTORY: No family history on file.    SOCIAL HISTORY:   Social History     Socioeconomic History    Marital status:      Spouse name: None    Number of children: None    Years of education: None    Highest education level: None   Tobacco Use    Smoking status: Never    Smokeless tobacco: Never   Substance and Sexual Activity    Alcohol use: Not Currently    Drug use: Not Currently       Review Of Systems:  Skin: No rash, pruritis, or skin pigmentation  Eyes: No changes in vision  Ears/Nose/Throat: No changes in hearing, no nosebleeds  Respiratory: No shortness of breath, dyspnea on exertion, cough, or hemoptysis  Cardiovascular: No chest pain or palpitations  Gastrointestinal: No diarrhea or constipation. No abdominal pain. No hematochezia  Genitourinary: see HPI  Musculoskeletal: No pain or swelling of joints, normal range of motion  Neurologic: No weakness or tremors  Psychiatric: No recent changes in memory or mood  Hematologic/Lymphatic/Immunologic: No easy bruising or enlarged lymph nodes  Endocrine: No weight gain or loss      PHYSICAL EXAM:    BP (!) 144/80   Ht 1.791 m (5' 10.5\")   Wt 115.7 kg (255 lb)   BMI 36.07 kg/m  "     Constitutional: Well developed. Conversant and in no acute distress  Eyes: Anicteric sclera, conjunctiva clear, normal extraocular movements  ENT: Normocephalic and atraumatic,   Skin: Warm and dry. No rashes or lesions  Cardiac: No peripheral edema  Back/Flank: Not done  CNS/PNS: Normal musculature and movements, moves all extremities normally  Respiratory: Normal non-labored breathing  Abdomen: Soft nontender and nondistended  Peripheral Vascular: No peripheral edema  Mental Status/Psych: Alert and Oriented x 3. Normal mood and affect    Penis: Not done  Scrotal Skin: Not done  Testicles: Not done  Epididymis: Not done  Digital Rectal Exam:     Cystoscopy: Not done    Imaging: None    Urinalysis: UA RESULTS:  Recent Labs   Lab Test 09/08/23  0804   COLOR Yellow   APPEARANCE Cloudy*   URINEGLC Negative   URINEBILI Negative   URINEKETONE Negative   SG 1.010   UBLD Small*   URINEPH 5.5   PROTEIN 30*   UROBILINOGEN 0.2   NITRITE Negative   LEUKEST Large*       PSA:     Post Void Residual:     Other labs: None today      IMPRESSION:  Enlarged prostate  Elevated PSA  History of retention  History of urinary tract infection    PLAN:  We attempted to feel his bladder with water through the catheter today but he had a strong urge to urinate after only 100 mL of instillation.  We removed the catheter.  He was unable to urinate.    I recommended that he drink large amounts of water over lunch today and that he come back to clinic if he is unable to urinate today.  Otherwise, if he is able to urinate I recommended that he continue taking the Flomax daily.  I recommended that he finish up his course of Bactrim antibiotics.  I also recommended that he see me again in 1 month to recheck PSA urinalysis and bladder scan.      Elton Melton M.D.

## 2023-09-14 ENCOUNTER — ALLIED HEALTH/NURSE VISIT (OUTPATIENT)
Dept: UROLOGY | Facility: CLINIC | Age: 73
End: 2023-09-14
Payer: MEDICARE

## 2023-09-14 DIAGNOSIS — R33.9 URINARY RETENTION: Primary | ICD-10-CM

## 2023-09-14 PROCEDURE — 51702 INSERT TEMP BLADDER CATH: CPT

## 2023-09-14 RX ORDER — LIDOCAINE HYDROCHLORIDE 20 MG/ML
JELLY TOPICAL ONCE
Status: COMPLETED | OUTPATIENT
Start: 2023-09-14 | End: 2023-09-14

## 2023-09-14 RX ADMIN — LIDOCAINE HYDROCHLORIDE 5 ML: 20 JELLY TOPICAL at 15:09

## 2023-09-14 NOTE — PROGRESS NOTES
Kunal Sharma comes into clinic today at the request of Dr. Melton Ordering Provider for Catheter Insertion.    Pt presents to clinic today following trial of void yesterday and being unable to urinate. Pt states he was up trying to urinate every 15 minutes over the night, and now every half hour during the day. Pt states he has only been able to void small amounts. Pt's bladder scan today showed 547 mL in bladder. It was recommended for pt to have catheter replaced and follow up with Dr. Melton. Pt agreed to this plan.      Catheter insertion documentation on 9/14/2023:    Kunal Sharma presents to the clinic for catheter insertion.  Reason for insertion: urinary retention  Order has been verified. yes  Catheter successfully inserted into the urethral meatus in the usual sterile fashion without immediate complication.  Type of catheter placed: 16 [place] Coude catheter  Urine is yellow in color.  700 cc's of urine output returned.  Balloon was filled with 8 cc's of normal saline.  Securement device placed for the catheter.  The patient tolerated the procedure and was instructed to follow up with their PCP or consultant as planned, monitor for catheter dysfunction, and watch for signs of infection.    Pt is currently taking Bactrim, so an additional antibiotic was not sent for catheter placement today. A message will be sent out to Dr. Melton for follow up, pt was instructed they would be notified by our office for plan. Pt expressed understanding of this.     This service provided today was under the supervising provider of the day Dr. Cespedes, who was available if needed.    Rosa Jurado, [facility]

## 2023-09-19 ENCOUNTER — TELEPHONE (OUTPATIENT)
Dept: UROLOGY | Facility: CLINIC | Age: 73
End: 2023-09-19
Payer: MEDICARE

## 2023-09-19 NOTE — TELEPHONE ENCOUNTER
----- Message from Elton Melton MD sent at 9/14/2023  3:26 PM CDT -----  Regarding: RE: Follow up  Thanks, he needs to see me for an office cystoscopy sometime within the next 4 weeks    ----- Message -----  From: Rosa Jurado CMA  Sent: 9/14/2023   3:12 PM CDT  To: Elton Melton MD  Subject: Follow up                                        Hi Dr. Melton, this pt was in yesterday for TOV, he was unable to urinate at the time and went home to try to urinate on his own. He returned today, he has been trying to urinate every half hour. I replaced his catheter and got 700 mL back. He was supposed to come back in a month to see you for a follow up and scan, would you like him to come back sooner than that?    Thanks!!    Carmen

## 2023-10-09 ENCOUNTER — LAB (OUTPATIENT)
Dept: LAB | Facility: CLINIC | Age: 73
End: 2023-10-09
Payer: MEDICARE

## 2023-10-09 DIAGNOSIS — R97.20 ELEVATED PSA: ICD-10-CM

## 2023-10-09 LAB — PSA SERPL DL<=0.01 NG/ML-MCNC: 7.99 NG/ML (ref 0–6.5)

## 2023-10-09 PROCEDURE — 36415 COLL VENOUS BLD VENIPUNCTURE: CPT

## 2023-10-09 PROCEDURE — 84153 ASSAY OF PSA TOTAL: CPT

## 2023-10-11 ENCOUNTER — OFFICE VISIT (OUTPATIENT)
Dept: UROLOGY | Facility: CLINIC | Age: 73
End: 2023-10-11
Payer: MEDICARE

## 2023-10-11 VITALS
SYSTOLIC BLOOD PRESSURE: 132 MMHG | DIASTOLIC BLOOD PRESSURE: 80 MMHG | WEIGHT: 255 LBS | HEIGHT: 71 IN | BODY MASS INDEX: 35.7 KG/M2

## 2023-10-11 DIAGNOSIS — N40.0 ENLARGED PROSTATE: ICD-10-CM

## 2023-10-11 DIAGNOSIS — R33.9 URINARY RETENTION: Primary | ICD-10-CM

## 2023-10-11 DIAGNOSIS — Z79.2 PROPHYLACTIC ANTIBIOTIC: ICD-10-CM

## 2023-10-11 DIAGNOSIS — R97.20 ELEVATED PSA: ICD-10-CM

## 2023-10-11 PROCEDURE — 52000 CYSTOURETHROSCOPY: CPT | Performed by: UROLOGY

## 2023-10-11 PROCEDURE — 99214 OFFICE O/P EST MOD 30 MIN: CPT | Mod: 25 | Performed by: UROLOGY

## 2023-10-11 RX ORDER — LIDOCAINE HYDROCHLORIDE 20 MG/ML
JELLY TOPICAL ONCE
Status: COMPLETED | OUTPATIENT
Start: 2023-10-11 | End: 2023-10-11

## 2023-10-11 RX ORDER — CIPROFLOXACIN 500 MG/1
500 TABLET, FILM COATED ORAL ONCE
Qty: 1 TABLET | Refills: 0 | Status: SHIPPED | OUTPATIENT
Start: 2023-10-11 | End: 2023-10-11

## 2023-10-11 RX ADMIN — LIDOCAINE HYDROCHLORIDE: 20 JELLY TOPICAL at 09:19

## 2023-10-11 ASSESSMENT — PAIN SCALES - GENERAL: PAINLEVEL: NO PAIN (0)

## 2023-10-11 NOTE — PATIENT INSTRUCTIONS

## 2023-10-11 NOTE — NURSING NOTE
Kunal Sharma comes into clinic today for Urinary Retention  at the request of Dr. Melton, Ordering Provider for Self Intermittent Catheterization Teach.    Patient has BPH with chronic urinary retention and will need to self-catheterize 3 times per day.    Due to BPH, patient cannot pass a straight catheter and requires a coude-tip catheter.  Patient was instructed on how to perform self catheterization using proper hygiene.  Catheter used for instruction: 14 Fr coude [place] VaPro  Using the above catheter, patient successfully demonstrated the ability to pass the catheter and drain the bladder.    Per Dr. Melton, patient should perform SIC 3 times daily.    Patient was sent home with 2 days-worth of the above catheters.    This service provided today was under the supervising provider of the day Dr. Melton, who was available if needed.    Phoebe Dillard, EMT

## 2023-10-11 NOTE — NURSING NOTE
Chief Complaint   Patient presents with    Urinary Retention     Cystoscopy     Prior to the start of the procedure and with procedural staff participation, I verbally confirmed the patient s identity using two indicators, relevant allergies, that the procedure was appropriate and matched the consent or emergent situation, and that the correct equipment/implants were available. Immediately prior to starting the procedure I conducted the Time Out with the procedural staff and re-confirmed the patient s name, procedure, and site/side. I have wiped the patient off with the povidone-Iodine solution, draped them, and used Lidocaine hydrochloride jelly. (The Joint Commission universal protocol was followed.)  Yes    Sedation (Moderate or Deep): None    5mL 2% lidocaine hydrochloride Urojet instilled into urethra.    NDC# [address]-0  Lot #: [id number]  Expiration Date:  2/25    Approximately 300 mL sterile water was instilled into the bladder during the cystoscopy.  After the scope was removed, patient voided approximately 60 mL.    Phoebe Dillard, EMT

## 2023-10-11 NOTE — LETTER
10/11/2023       RE: Kunal Sharma  [address] [place] [id number]     Dear Colleague,    Thank you for referring your patient, Kunal Sharma, to the [facility] [place] at [facility]. Please see a copy of my visit note below.    Office Visit Note  [facility]  [phone]    UROLOGIC DIAGNOSES:   Enlarged prostate  Urinary retention  Elevated PSA  History of urinary tract infection    CURRENT INTERVENTIONS:   Flomax    HISTORY:   Kunal returns to clinic today for urologic follow-up.  His Dumont catheter had to be replaced the day after removal last month.  He had 547 mL in the bladder.  PSA has improved somewhat at 7.99.      PAST MEDICAL HISTORY: No past medical history on file.    PAST SURGICAL HISTORY:   Past Surgical History:   Procedure Laterality Date    HERNIORRHAPHY INCISIONAL (LOCATION) N/A 12/12/2019    Procedure: Repair of strangulated ventral hernia with obstruction;  Surgeon: Lenny Forte MD;  Location:  OR       FAMILY HISTORY: No family history on file.    SOCIAL HISTORY:   Social History     Socioeconomic History    Marital status:    Tobacco Use    Smoking status: Never    Smokeless tobacco: Never   Substance and Sexual Activity    Alcohol use: Not Currently    Drug use: Not Currently       Review Of Systems:  Skin: No rash, pruritis, or skin pigmentation  Eyes: No changes in vision  Ears/Nose/Throat: No changes in hearing, no nosebleeds  Respiratory: No shortness of breath, dyspnea on exertion, cough, or hemoptysis  Cardiovascular: No chest pain or palpitations  Gastrointestinal: No diarrhea or constipation. No abdominal pain. No hematochezia  Genitourinary: see HPI  Musculoskeletal: No pain or swelling of joints, normal range of motion  Neurologic: No weakness or tremors  Psychiatric: No recent changes in memory or mood  Hematologic/Lymphatic/Immunologic: No easy bruising or enlarged  lymph nodes  Endocrine: No weight gain or loss      PHYSICAL EXAM:    There were no vitals taken for this visit.    Constitutional: Well developed. Conversant and in no acute distress  Eyes: Anicteric sclera, conjunctiva clear, normal extraocular movements  ENT: Normocephalic and atraumatic,   Skin: Warm and dry. No rashes or lesions  Cardiac: No peripheral edema  Back/Flank: Not done  CNS/PNS: Normal musculature and movements, moves all extremities normally  Respiratory: Normal non-labored breathing  Abdomen: Soft nontender and nondistended  Peripheral Vascular: No peripheral edema  Mental Status/Psych: Alert and Oriented x 3. Normal mood and affect    Penis: Not done  Scrotal Skin: Not done  Testicles: Not done  Epididymis: Not done  Digital Rectal Exam:     Cystoscopy: I performed flexible cystoscopy today and the bladder showed trabeculations but otherwise was normal throughout.  No tumors identified throughout the bladder.  On retroflexion of the scope he had a circumferentially enlarged intravesical segment of the prostate.  In pulling back the scope he had substantial lateral lobe obstruction.  I filled the bladder with 300 mL of water and that was all that the bladder was able to hold.  I removed the scope.  He was only able to void a small amount.    Imaging: None    Urinalysis: UA RESULTS:  Recent Labs   Lab Test 09/08/23  0804   COLOR Yellow   APPEARANCE Cloudy*   URINEGLC Negative   URINEBILI Negative   URINEKETONE Negative   SG 1.010   UBLD Small*   URINEPH 5.5   PROTEIN 30*   UROBILINOGEN 0.2   NITRITE Negative   LEUKEST Large*       PSA: 7.99    Post Void Residual:     Other labs: None today      IMPRESSION:  Elevated PSA  Enlarged prostate  Urinary retention    PLAN:  He continues to retain urine today. I recommended that, in the short term at least, he learn self catheterization and self catheterize at least once every 24 hours. He agreed and was taught self catheterization today. I counseled him  that he will likely require an outlet procedure for his BPH if he wishes to avoid long term catheterization and we discussed surgical treatment options.    Due to his high elevated PSA, I recommended an MRI of the prostate before considering surgical treatment options for BPH.    MRI of the prostate was ordered for him and we will go over the results together      Elton Melton M.D.

## 2023-10-11 NOTE — PROGRESS NOTES
Office Visit Note  [facility]  [phone]    UROLOGIC DIAGNOSES:   Enlarged prostate  Urinary retention  Elevated PSA  History of urinary tract infection    CURRENT INTERVENTIONS:   Flomax    HISTORY:   Kunal returns to clinic today for urologic follow-up.  His Dumont catheter had to be replaced the day after removal last month.  He had 547 mL in the bladder.  PSA has improved somewhat at 7.99.      PAST MEDICAL HISTORY: No past medical history on file.    PAST SURGICAL HISTORY:   Past Surgical History:   Procedure Laterality Date    HERNIORRHAPHY INCISIONAL (LOCATION) N/A 12/12/2019    Procedure: Repair of strangulated ventral hernia with obstruction;  Surgeon: Lenny Forte MD;  Location: [facility] OR       FAMILY HISTORY: No family history on file.    SOCIAL HISTORY:   Social History     Socioeconomic History    Marital status:    Tobacco Use    Smoking status: Never    Smokeless tobacco: Never   Substance and Sexual Activity    Alcohol use: Not Currently    Drug use: Not Currently       Review Of Systems:  Skin: No rash, pruritis, or skin pigmentation  Eyes: No changes in vision  Ears/Nose/Throat: No changes in hearing, no nosebleeds  Respiratory: No shortness of breath, dyspnea on exertion, cough, or hemoptysis  Cardiovascular: No chest pain or palpitations  Gastrointestinal: No diarrhea or constipation. No abdominal pain. No hematochezia  Genitourinary: see HPI  Musculoskeletal: No pain or swelling of joints, normal range of motion  Neurologic: No weakness or tremors  Psychiatric: No recent changes in memory or mood  Hematologic/Lymphatic/Immunologic: No easy bruising or enlarged lymph nodes  Endocrine: No weight gain or loss      PHYSICAL EXAM:    There were no vitals taken for this visit.    Constitutional: Well developed. Conversant and in no acute distress  Eyes: Anicteric sclera, conjunctiva clear, normal extraocular movements  ENT: Normocephalic and atraumatic,   Skin: Warm and dry.  No rashes or lesions  Cardiac: No peripheral edema  Back/Flank: Not done  CNS/PNS: Normal musculature and movements, moves all extremities normally  Respiratory: Normal non-labored breathing  Abdomen: Soft nontender and nondistended  Peripheral Vascular: No peripheral edema  Mental Status/Psych: Alert and Oriented x 3. Normal mood and affect    Penis: Not done  Scrotal Skin: Not done  Testicles: Not done  Epididymis: Not done  Digital Rectal Exam:     Cystoscopy: I performed flexible cystoscopy today and the bladder showed trabeculations but otherwise was normal throughout.  No tumors identified throughout the bladder.  On retroflexion of the scope he had a circumferentially enlarged intravesical segment of the prostate.  In pulling back the scope he had substantial lateral lobe obstruction.  I filled the bladder with 300 mL of water and that was all that the bladder was able to hold.  I removed the scope.  He was only able to void a small amount.    Imaging: None    Urinalysis: UA RESULTS:  Recent Labs   Lab Test 09/08/23  0804   COLOR Yellow   APPEARANCE Cloudy*   URINEGLC Negative   URINEBILI Negative   URINEKETONE Negative   SG 1.010   UBLD Small*   URINEPH 5.5   PROTEIN 30*   UROBILINOGEN 0.2   NITRITE Negative   LEUKEST Large*       PSA: 7.99    Post Void Residual:     Other labs: None today      IMPRESSION:  Elevated PSA  Enlarged prostate  Urinary retention    PLAN:  He continues to retain urine today. I recommended that, in the short term at least, he learn self catheterization and self catheterize at least once every 24 hours. He agreed and was taught self catheterization today. I counseled him that he will likely require an outlet procedure for his BPH if he wishes to avoid long term catheterization and we discussed surgical treatment options.    Due to his high elevated PSA, I recommended an MRI of the prostate before considering surgical treatment options for BPH.    MRI of the prostate was ordered for  him and we will go over the results together        Elton Melton M.D.

## 2023-10-28 ENCOUNTER — ANCILLARY PROCEDURE (OUTPATIENT)
Dept: MRI IMAGING | Facility: CLINIC | Age: 73
End: 2023-10-28
Attending: UROLOGY
Payer: MEDICARE

## 2023-10-28 DIAGNOSIS — N40.0 ENLARGED PROSTATE: ICD-10-CM

## 2023-10-28 PROCEDURE — A9585 GADOBUTROL INJECTION: HCPCS | Mod: JZ | Performed by: RADIOLOGY

## 2023-10-28 PROCEDURE — G1010 CDSM STANSON: HCPCS | Performed by: RADIOLOGY

## 2023-10-28 PROCEDURE — 72197 MRI PELVIS W/O & W/DYE: CPT | Mod: MG | Performed by: RADIOLOGY

## 2023-10-28 RX ORDER — GADOBUTROL 604.72 MG/ML
15 INJECTION INTRAVENOUS ONCE
Status: COMPLETED | OUTPATIENT
Start: 2023-10-28 | End: 2023-10-28

## 2023-10-28 RX ADMIN — GADOBUTROL 11.5 ML: 604.72 INJECTION INTRAVENOUS at 07:32

## 2023-10-30 ENCOUNTER — VIRTUAL VISIT (OUTPATIENT)
Dept: UROLOGY | Facility: CLINIC | Age: 73
End: 2023-10-30
Payer: MEDICARE

## 2023-10-30 VITALS — BODY MASS INDEX: 35.93 KG/M2 | WEIGHT: 251 LBS | HEIGHT: 70 IN

## 2023-10-30 DIAGNOSIS — N40.0 ENLARGED PROSTATE: Primary | ICD-10-CM

## 2023-10-30 PROCEDURE — 99214 OFFICE O/P EST MOD 30 MIN: CPT | Mod: VID | Performed by: UROLOGY

## 2023-10-30 RX ORDER — CEFAZOLIN SODIUM 2 G/50ML
2 SOLUTION INTRAVENOUS SEE ADMIN INSTRUCTIONS
Status: CANCELLED | OUTPATIENT
Start: 2023-10-30

## 2023-10-30 RX ORDER — CEFAZOLIN SODIUM 2 G/50ML
2 SOLUTION INTRAVENOUS
Status: CANCELLED | OUTPATIENT
Start: 2023-10-30

## 2023-10-30 ASSESSMENT — PAIN SCALES - GENERAL: PAINLEVEL: NO PAIN (0)

## 2023-10-30 NOTE — LETTER
10/30/2023       RE: Kunal Sharma  [address] [place] [id number]     Dear Colleague,    Thank you for referring your patient, Kunal Sharma, to the [facility] [place] at [facility]. Please see a copy of my visit note below.    Office Visit Note  [facility]  [phone]    UROLOGIC DIAGNOSES:   Enlarged prostate  Urinary retention  Elevated PSA  History of urinary tract infection    CURRENT INTERVENTIONS:   Flomax  Self-catheterization    HISTORY:   Kunal had his MRI of the prostate performed and it showed a 117 g prostate with no areas concerning for prostate cancer.  He has needed to self catheterize 4 times daily since he last saw me in clinic.  He does void very small amounts in between catheterization but says he is mainly dependent on the catheter at this time.      PAST MEDICAL HISTORY: No past medical history on file.    PAST SURGICAL HISTORY:   Past Surgical History:   Procedure Laterality Date    HERNIORRHAPHY INCISIONAL (LOCATION) N/A 12/12/2019    Procedure: Repair of strangulated ventral hernia with obstruction;  Surgeon: Lenny Forte MD;  Location:  OR       FAMILY HISTORY: No family history on file.    SOCIAL HISTORY:   Social History     Tobacco Use    Smoking status: Never    Smokeless tobacco: Never   Substance Use Topics    Alcohol use: Not Currently       REVIEW OF SYSTEMS:  Skin: No rash, pruritis, or skin pigmentation  Eyes: No changes in vision  Ears/Nose/Throat: No changes in hearing, no nosebleeds  Respiratory: No shortness of breath, dyspnea on exertion, cough, or hemoptysis  Cardiovascular: No chest pain or palpitations  Gastrointestinal: No diarrhea or constipation. No abdominal pain. No hematochezia  Genitourinary: see HPI  Musculoskeletal: No pain or swelling of joints, normal range of motion  Neurologic: No weakness or tremors  Psychiatric: No recent changes in memory or  mood  Hematologic/Lymphatic/Immunologic: No easy bruising or enlarged lymph nodes  Endocrine: No weight gain or loss      PHYSICAL EXAM:    General: Alert and oriented to time, place, and self. In NAD   HEENT: Head AT/NC, EOMI, CN Grossly intact   Lungs: no respiratory distress, or pursed lip breathing   Heart: No obvious jugular venous distension present   Musculoskeltal: Normal movements. Normal appearing musculature  Skin: no suspicious lesions or rashes   Neuro: Alert, oriented, speech and mentation normal; moving all 4 extremities equally.   Psych: affect and mood normal    Imaging: None    Urinalysis: UA RESULTS:  Recent Labs   Lab Test 09/08/23  0804   COLOR Yellow   APPEARANCE Cloudy*   URINEGLC Negative   URINEBILI Negative   URINEKETONE Negative   SG 1.010   UBLD Small*   URINEPH 5.5   PROTEIN 30*   UROBILINOGEN 0.2   NITRITE Negative   LEUKEST Large*       PSA: 7.99    Post Void Residual:     Other labs: None today      IMPRESSION:  Enlarged prostate  Elevated PSA    PLAN:  His MRI of the prostate shows a very large prostate.  His PSA density is in the normal range.  We did discuss the potential for false negatives with the MRI of the prostate but at this time I think we can forego a prostate biopsy.    We discussed options for his retention and BPH including continued self-catheterization versus surgical options.  His surgical treatment options would be TURP or HoLEP or Aquablation of the prostate.  He would like to undergo surgery.  We discussed surgical treatment options and he wishes to undergo a transurethral resection of the prostate.  We discussed transurethral resection of prostate in detail today along with its risks and expected recovery.  All of his questions were answered.  He wishes to proceed.  We will get him scheduled in the operating room and he will need to be admitted to the hospital postoperatively.      Elton Melton M.D.              Virtual Visit Details    Type of service:   Video Visit   Video Start Time: 1:22 PM  Video End Time: 1:35 PM    Originating Location (pt. Location): Home    Distant Location (provider location):  On-site  [facility] Visit[facility]

## 2023-10-30 NOTE — NURSING NOTE
No other vitals to report today      Is the patient currently in the state of [place]? YES    Visit mode:VIDEO    If the visit is dropped, the patient can be reconnected by: VIDEO VISIT: Text to cell phone:   Telephone Information:   Mobile [phone]       Will anyone else be joining the visit? Pts wife   (If patient encounters technical issues they should call [phone]    How would you like to obtain your AVS? MyChart    Are changes needed to the allergy or medication list? Yes Pt flagged medications for removal during e check in     Patient declined individual allergy and medication review by support staff because patient denies any changes since echeck-in completion and states all information entered during echeck-in remains accurate.    Reason for visit: ASHLEY Hernandez MA VVF

## 2023-10-30 NOTE — PROGRESS NOTES
Office Visit Note  [facility]  [phone]    UROLOGIC DIAGNOSES:   Enlarged prostate  Urinary retention  Elevated PSA  History of urinary tract infection    CURRENT INTERVENTIONS:   Flomax  Self-catheterization    HISTORY:   Kunal had his MRI of the prostate performed and it showed a 117 g prostate with no areas concerning for prostate cancer.  He has needed to self catheterize 4 times daily since he last saw me in clinic.  He does void very small amounts in between catheterization but says he is mainly dependent on the catheter at this time.      PAST MEDICAL HISTORY: No past medical history on file.    PAST SURGICAL HISTORY:   Past Surgical History:   Procedure Laterality Date    HERNIORRHAPHY INCISIONAL (LOCATION) N/A 12/12/2019    Procedure: Repair of strangulated ventral hernia with obstruction;  Surgeon: Lenny Forte MD;  Location:  OR       FAMILY HISTORY: No family history on file.    SOCIAL HISTORY:   Social History     Tobacco Use    Smoking status: Never    Smokeless tobacco: Never   Substance Use Topics    Alcohol use: Not Currently       REVIEW OF SYSTEMS:  Skin: No rash, pruritis, or skin pigmentation  Eyes: No changes in vision  Ears/Nose/Throat: No changes in hearing, no nosebleeds  Respiratory: No shortness of breath, dyspnea on exertion, cough, or hemoptysis  Cardiovascular: No chest pain or palpitations  Gastrointestinal: No diarrhea or constipation. No abdominal pain. No hematochezia  Genitourinary: see HPI  Musculoskeletal: No pain or swelling of joints, normal range of motion  Neurologic: No weakness or tremors  Psychiatric: No recent changes in memory or mood  Hematologic/Lymphatic/Immunologic: No easy bruising or enlarged lymph nodes  Endocrine: No weight gain or loss      PHYSICAL EXAM:    General: Alert and oriented to time, place, and self. In NAD   HEENT: Head AT/NC, EOMI, CN Grossly intact   Lungs: no respiratory distress, or pursed lip breathing   Heart: No  obvious jugular venous distension present   Musculoskeltal: Normal movements. Normal appearing musculature  Skin: no suspicious lesions or rashes   Neuro: Alert, oriented, speech and mentation normal; moving all 4 extremities equally.   Psych: affect and mood normal    Imaging: None    Urinalysis: UA RESULTS:  Recent Labs   Lab Test 09/08/23  0804   COLOR Yellow   APPEARANCE Cloudy*   URINEGLC Negative   URINEBILI Negative   URINEKETONE Negative   SG 1.010   UBLD Small*   URINEPH 5.5   PROTEIN 30*   UROBILINOGEN 0.2   NITRITE Negative   LEUKEST Large*       PSA: 7.99    Post Void Residual:     Other labs: None today      IMPRESSION:  Enlarged prostate  Elevated PSA    PLAN:  His MRI of the prostate shows a very large prostate.  His PSA density is in the normal range.  We did discuss the potential for false negatives with the MRI of the prostate but at this time I think we can forego a prostate biopsy.    We discussed options for his retention and BPH including continued self-catheterization versus surgical options.  His surgical treatment options would be TURP or HoLEP or Aquablation of the prostate.  He would like to undergo surgery.  We discussed surgical treatment options and he wishes to undergo a transurethral resection of the prostate.  We discussed transurethral resection of prostate in detail today along with its risks and expected recovery.  All of his questions were answered.  He wishes to proceed.  We will get him scheduled in the operating room and he will need to be admitted to the hospital postoperatively.      Elton Melton M.D.              Virtual Visit Details    Type of service:  Video Visit   Video Start Time: 1:22 PM  Video End Time: 1:35 PM    Originating Location (pt. Location): Home    Distant Location (provider location):  On-site  Platform used for Video Visit: Sita

## 2023-11-01 ENCOUNTER — TELEPHONE (OUTPATIENT)
Dept: UROLOGY | Facility: CLINIC | Age: 73
End: 2023-11-01
Payer: MEDICARE

## 2023-11-01 NOTE — TELEPHONE ENCOUNTER
Patient is scheduled for surgery with Dr. crisostomo    Spoke with: alfredo    Date of Surgery: 1213    Location: [facility]    Informed patient they will need an adult  y    Pre op with Provider y    H&P: Scheduled with pt to sched    Additional imaging/appointments: n    Surgery packet: I mailed     Additional comments: post op made        Mireya Trejo on 11/1/2023 at 9:13 AM

## 2023-11-04 ENCOUNTER — HEALTH MAINTENANCE LETTER (OUTPATIENT)
Age: 73
End: 2023-11-04

## 2023-12-04 ENCOUNTER — LAB REQUISITION (OUTPATIENT)
Dept: LAB | Facility: CLINIC | Age: 73
End: 2023-12-04
Payer: MEDICARE

## 2023-12-04 DIAGNOSIS — N30.01 ACUTE CYSTITIS WITH HEMATURIA: ICD-10-CM

## 2023-12-04 PROCEDURE — 87086 URINE CULTURE/COLONY COUNT: CPT | Mod: ORL | Performed by: STUDENT IN AN ORGANIZED HEALTH CARE EDUCATION/TRAINING PROGRAM

## 2023-12-05 RX ORDER — LEVOFLOXACIN 750 MG/1
750 TABLET, FILM COATED ORAL DAILY
COMMUNITY
Start: 2023-12-05 | End: 2023-12-15

## 2023-12-06 LAB — BACTERIA UR CULT: ABNORMAL

## 2023-12-13 ENCOUNTER — HOSPITAL ENCOUNTER (INPATIENT)
Facility: CLINIC | Age: 73
LOS: 1 days | Discharge: HOME | DRG: 714 | End: 2023-12-15
Attending: UROLOGY | Admitting: UROLOGY
Payer: MEDICARE

## 2023-12-13 ENCOUNTER — ANESTHESIA EVENT (OUTPATIENT)
Dept: SURGERY | Facility: CLINIC | Age: 73
DRG: 714 | End: 2023-12-13
Payer: MEDICARE

## 2023-12-13 ENCOUNTER — ANESTHESIA (OUTPATIENT)
Dept: SURGERY | Facility: CLINIC | Age: 73
DRG: 714 | End: 2023-12-13
Payer: MEDICARE

## 2023-12-13 DIAGNOSIS — N40.0 ENLARGED PROSTATE: Primary | ICD-10-CM

## 2023-12-13 PROBLEM — N40.1 BENIGN PROSTATIC HYPERPLASIA WITH URINARY OBSTRUCTION: Status: ACTIVE | Noted: 2021-04-27

## 2023-12-13 PROBLEM — N13.8 BENIGN PROSTATIC HYPERPLASIA WITH URINARY OBSTRUCTION: Status: ACTIVE | Noted: 2021-04-27

## 2023-12-13 PROBLEM — N39.0 RECURRENT UTI: Status: ACTIVE | Noted: 2021-08-07

## 2023-12-13 LAB
GLUCOSE BLDC GLUCOMTR-MCNC: 131 MG/DL (ref 70–99)
GLUCOSE BLDC GLUCOMTR-MCNC: 150 MG/DL (ref 70–99)

## 2023-12-13 PROCEDURE — 250N000011 HC RX IP 250 OP 636: Performed by: ANESTHESIOLOGY

## 2023-12-13 PROCEDURE — 82962 GLUCOSE BLOOD TEST: CPT

## 2023-12-13 PROCEDURE — 250N000009 HC RX 250: Performed by: NURSE ANESTHETIST, CERTIFIED REGISTERED

## 2023-12-13 PROCEDURE — 999N000141 HC STATISTIC PRE-PROCEDURE NURSING ASSESSMENT: Performed by: UROLOGY

## 2023-12-13 PROCEDURE — 52601 PROSTATECTOMY (TURP): CPT | Performed by: UROLOGY

## 2023-12-13 PROCEDURE — 258N000003 HC RX IP 258 OP 636: Performed by: NURSE ANESTHETIST, CERTIFIED REGISTERED

## 2023-12-13 PROCEDURE — 258N000003 HC RX IP 258 OP 636: Performed by: ANESTHESIOLOGY

## 2023-12-13 PROCEDURE — 272N000001 HC OR GENERAL SUPPLY STERILE: Performed by: UROLOGY

## 2023-12-13 PROCEDURE — 360N000076 HC SURGERY LEVEL 3, PER MIN: Performed by: UROLOGY

## 2023-12-13 PROCEDURE — 88305 TISSUE EXAM BY PATHOLOGIST: CPT | Mod: TC | Performed by: UROLOGY

## 2023-12-13 PROCEDURE — 370N000017 HC ANESTHESIA TECHNICAL FEE, PER MIN: Performed by: UROLOGY

## 2023-12-13 PROCEDURE — 710N000009 HC RECOVERY PHASE 1, LEVEL 1, PER MIN: Performed by: UROLOGY

## 2023-12-13 PROCEDURE — 250N000011 HC RX IP 250 OP 636: Mod: JZ | Performed by: NURSE ANESTHETIST, CERTIFIED REGISTERED

## 2023-12-13 PROCEDURE — 0VT08ZZ RESECTION OF PROSTATE, VIA NATURAL OR ARTIFICIAL OPENING ENDOSCOPIC: ICD-10-PCS | Performed by: UROLOGY

## 2023-12-13 PROCEDURE — 258N000001 HC RX 258: Performed by: UROLOGY

## 2023-12-13 PROCEDURE — 258N000003 HC RX IP 258 OP 636: Performed by: UROLOGY

## 2023-12-13 PROCEDURE — 250N000013 HC RX MED GY IP 250 OP 250 PS 637: Performed by: UROLOGY

## 2023-12-13 PROCEDURE — 250N000025 HC SEVOFLURANE, PER MIN: Performed by: UROLOGY

## 2023-12-13 PROCEDURE — 250N000011 HC RX IP 250 OP 636: Performed by: UROLOGY

## 2023-12-13 RX ORDER — NALOXONE HYDROCHLORIDE 0.4 MG/ML
0.4 INJECTION, SOLUTION INTRAMUSCULAR; INTRAVENOUS; SUBCUTANEOUS
Status: DISCONTINUED | OUTPATIENT
Start: 2023-12-13 | End: 2023-12-15 | Stop reason: HOSPADM

## 2023-12-13 RX ORDER — LIDOCAINE 40 MG/G
CREAM TOPICAL
Status: DISCONTINUED | OUTPATIENT
Start: 2023-12-13 | End: 2023-12-15 | Stop reason: HOSPADM

## 2023-12-13 RX ORDER — GLYCOPYRROLATE 0.2 MG/ML
INJECTION, SOLUTION INTRAMUSCULAR; INTRAVENOUS PRN
Status: DISCONTINUED | OUTPATIENT
Start: 2023-12-13 | End: 2023-12-13

## 2023-12-13 RX ORDER — SODIUM CHLORIDE, SODIUM LACTATE, POTASSIUM CHLORIDE, CALCIUM CHLORIDE 600; 310; 30; 20 MG/100ML; MG/100ML; MG/100ML; MG/100ML
INJECTION, SOLUTION INTRAVENOUS CONTINUOUS
Status: DISCONTINUED | OUTPATIENT
Start: 2023-12-13 | End: 2023-12-13 | Stop reason: HOSPADM

## 2023-12-13 RX ORDER — ONDANSETRON 4 MG/1
4-8 TABLET, ORALLY DISINTEGRATING ORAL EVERY 8 HOURS PRN
Qty: 10 TABLET | Refills: 0 | Status: SHIPPED | OUTPATIENT
Start: 2023-12-13 | End: 2023-12-15

## 2023-12-13 RX ORDER — OXYCODONE HYDROCHLORIDE 5 MG/1
10 TABLET ORAL
Status: DISCONTINUED | OUTPATIENT
Start: 2023-12-13 | End: 2023-12-13 | Stop reason: HOSPADM

## 2023-12-13 RX ORDER — DEXTROSE MONOHYDRATE, SODIUM CHLORIDE, AND POTASSIUM CHLORIDE 50; 1.49; 4.5 G/1000ML; G/1000ML; G/1000ML
INJECTION, SOLUTION INTRAVENOUS CONTINUOUS
Status: DISCONTINUED | OUTPATIENT
Start: 2023-12-13 | End: 2023-12-15 | Stop reason: HOSPADM

## 2023-12-13 RX ORDER — PROPOFOL 10 MG/ML
INJECTION, EMULSION INTRAVENOUS PRN
Status: DISCONTINUED | OUTPATIENT
Start: 2023-12-13 | End: 2023-12-13

## 2023-12-13 RX ORDER — FENTANYL CITRATE 50 UG/ML
INJECTION, SOLUTION INTRAMUSCULAR; INTRAVENOUS PRN
Status: DISCONTINUED | OUTPATIENT
Start: 2023-12-13 | End: 2023-12-13

## 2023-12-13 RX ORDER — OXYCODONE HYDROCHLORIDE 5 MG/1
10 TABLET ORAL EVERY 4 HOURS PRN
Status: DISCONTINUED | OUTPATIENT
Start: 2023-12-13 | End: 2023-12-15 | Stop reason: HOSPADM

## 2023-12-13 RX ORDER — ONDANSETRON 4 MG/1
4 TABLET, ORALLY DISINTEGRATING ORAL EVERY 30 MIN PRN
Status: DISCONTINUED | OUTPATIENT
Start: 2023-12-13 | End: 2023-12-13 | Stop reason: HOSPADM

## 2023-12-13 RX ORDER — NALOXONE HYDROCHLORIDE 0.4 MG/ML
0.2 INJECTION, SOLUTION INTRAMUSCULAR; INTRAVENOUS; SUBCUTANEOUS
Status: DISCONTINUED | OUTPATIENT
Start: 2023-12-13 | End: 2023-12-15 | Stop reason: HOSPADM

## 2023-12-13 RX ORDER — ONDANSETRON 2 MG/ML
4 INJECTION INTRAMUSCULAR; INTRAVENOUS EVERY 30 MIN PRN
Status: DISCONTINUED | OUTPATIENT
Start: 2023-12-13 | End: 2023-12-13 | Stop reason: HOSPADM

## 2023-12-13 RX ORDER — ONDANSETRON 2 MG/ML
4 INJECTION INTRAMUSCULAR; INTRAVENOUS EVERY 6 HOURS PRN
Status: DISCONTINUED | OUTPATIENT
Start: 2023-12-13 | End: 2023-12-15 | Stop reason: HOSPADM

## 2023-12-13 RX ORDER — SODIUM CHLORIDE, SODIUM LACTATE, POTASSIUM CHLORIDE, CALCIUM CHLORIDE 600; 310; 30; 20 MG/100ML; MG/100ML; MG/100ML; MG/100ML
INJECTION, SOLUTION INTRAVENOUS CONTINUOUS
Status: DISCONTINUED | OUTPATIENT
Start: 2023-12-13 | End: 2023-12-15 | Stop reason: HOSPADM

## 2023-12-13 RX ORDER — SODIUM CHLORIDE, SODIUM LACTATE, POTASSIUM CHLORIDE, CALCIUM CHLORIDE 600; 310; 30; 20 MG/100ML; MG/100ML; MG/100ML; MG/100ML
INJECTION, SOLUTION INTRAVENOUS CONTINUOUS PRN
Status: DISCONTINUED | OUTPATIENT
Start: 2023-12-13 | End: 2023-12-13

## 2023-12-13 RX ORDER — AMLODIPINE BESYLATE 10 MG/1
10 TABLET ORAL DAILY
Status: DISCONTINUED | OUTPATIENT
Start: 2023-12-13 | End: 2023-12-15 | Stop reason: HOSPADM

## 2023-12-13 RX ORDER — DEXAMETHASONE SODIUM PHOSPHATE 4 MG/ML
INJECTION, SOLUTION INTRA-ARTICULAR; INTRALESIONAL; INTRAMUSCULAR; INTRAVENOUS; SOFT TISSUE PRN
Status: DISCONTINUED | OUTPATIENT
Start: 2023-12-13 | End: 2023-12-13

## 2023-12-13 RX ORDER — HYDROXYZINE HYDROCHLORIDE 10 MG/1
10 TABLET, FILM COATED ORAL EVERY 6 HOURS PRN
Qty: 30 TABLET | Refills: 0 | Status: SHIPPED | OUTPATIENT
Start: 2023-12-13 | End: 2023-12-15

## 2023-12-13 RX ORDER — LIDOCAINE HYDROCHLORIDE 20 MG/ML
INJECTION, SOLUTION INFILTRATION; PERINEURAL PRN
Status: DISCONTINUED | OUTPATIENT
Start: 2023-12-13 | End: 2023-12-13

## 2023-12-13 RX ORDER — ONDANSETRON 4 MG/1
4 TABLET, ORALLY DISINTEGRATING ORAL EVERY 6 HOURS PRN
Status: DISCONTINUED | OUTPATIENT
Start: 2023-12-13 | End: 2023-12-15 | Stop reason: HOSPADM

## 2023-12-13 RX ORDER — ACETAMINOPHEN 325 MG/1
650 TABLET ORAL EVERY 4 HOURS PRN
Qty: 100 TABLET | Refills: 0 | Status: SHIPPED | OUTPATIENT
Start: 2023-12-13 | End: 2023-12-15

## 2023-12-13 RX ORDER — HYDROMORPHONE HCL IN WATER/PF 6 MG/30 ML
0.4 PATIENT CONTROLLED ANALGESIA SYRINGE INTRAVENOUS EVERY 5 MIN PRN
Status: DISCONTINUED | OUTPATIENT
Start: 2023-12-13 | End: 2023-12-13 | Stop reason: HOSPADM

## 2023-12-13 RX ORDER — ONDANSETRON 2 MG/ML
INJECTION INTRAMUSCULAR; INTRAVENOUS PRN
Status: DISCONTINUED | OUTPATIENT
Start: 2023-12-13 | End: 2023-12-13

## 2023-12-13 RX ORDER — HYDROMORPHONE HCL IN WATER/PF 6 MG/30 ML
0.2 PATIENT CONTROLLED ANALGESIA SYRINGE INTRAVENOUS EVERY 5 MIN PRN
Status: DISCONTINUED | OUTPATIENT
Start: 2023-12-13 | End: 2023-12-13 | Stop reason: HOSPADM

## 2023-12-13 RX ORDER — OXYCODONE HYDROCHLORIDE 5 MG/1
5 TABLET ORAL
Status: DISCONTINUED | OUTPATIENT
Start: 2023-12-13 | End: 2023-12-13 | Stop reason: HOSPADM

## 2023-12-13 RX ORDER — OXYCODONE HYDROCHLORIDE 5 MG/1
5 TABLET ORAL EVERY 4 HOURS PRN
Status: DISCONTINUED | OUTPATIENT
Start: 2023-12-13 | End: 2023-12-15 | Stop reason: HOSPADM

## 2023-12-13 RX ORDER — CEFAZOLIN SODIUM/WATER 2 G/20 ML
2 SYRINGE (ML) INTRAVENOUS
Status: COMPLETED | OUTPATIENT
Start: 2023-12-13 | End: 2023-12-13

## 2023-12-13 RX ORDER — DOCUSATE SODIUM 100 MG/1
100 CAPSULE, LIQUID FILLED ORAL 2 TIMES DAILY
Status: DISCONTINUED | OUTPATIENT
Start: 2023-12-13 | End: 2023-12-15 | Stop reason: HOSPADM

## 2023-12-13 RX ORDER — FENTANYL CITRATE 50 UG/ML
50 INJECTION, SOLUTION INTRAMUSCULAR; INTRAVENOUS EVERY 5 MIN PRN
Status: DISCONTINUED | OUTPATIENT
Start: 2023-12-13 | End: 2023-12-13 | Stop reason: HOSPADM

## 2023-12-13 RX ORDER — PROCHLORPERAZINE MALEATE 5 MG
5 TABLET ORAL EVERY 6 HOURS PRN
Status: DISCONTINUED | OUTPATIENT
Start: 2023-12-13 | End: 2023-12-15 | Stop reason: HOSPADM

## 2023-12-13 RX ORDER — FENTANYL CITRATE 50 UG/ML
25 INJECTION, SOLUTION INTRAMUSCULAR; INTRAVENOUS EVERY 5 MIN PRN
Status: DISCONTINUED | OUTPATIENT
Start: 2023-12-13 | End: 2023-12-13 | Stop reason: HOSPADM

## 2023-12-13 RX ORDER — ATORVASTATIN CALCIUM 40 MG/1
40 TABLET, FILM COATED ORAL AT BEDTIME
Status: DISCONTINUED | OUTPATIENT
Start: 2023-12-13 | End: 2023-12-15 | Stop reason: HOSPADM

## 2023-12-13 RX ORDER — CEFAZOLIN SODIUM/WATER 2 G/20 ML
2 SYRINGE (ML) INTRAVENOUS SEE ADMIN INSTRUCTIONS
Status: DISCONTINUED | OUTPATIENT
Start: 2023-12-13 | End: 2023-12-15 | Stop reason: HOSPADM

## 2023-12-13 RX ADMIN — LEVOFLOXACIN 750 MG: 500 TABLET, FILM COATED ORAL at 19:23

## 2023-12-13 RX ADMIN — OXYCODONE HYDROCHLORIDE 5 MG: 5 TABLET ORAL at 19:23

## 2023-12-13 RX ADMIN — Medication 2 G: at 13:01

## 2023-12-13 RX ADMIN — FENTANYL CITRATE 100 MCG: 50 INJECTION INTRAMUSCULAR; INTRAVENOUS at 13:02

## 2023-12-13 RX ADMIN — METFORMIN HYDROCHLORIDE 500 MG: 500 TABLET ORAL at 18:20

## 2023-12-13 RX ADMIN — SODIUM CHLORIDE, POTASSIUM CHLORIDE, SODIUM LACTATE AND CALCIUM CHLORIDE: 600; 310; 30; 20 INJECTION, SOLUTION INTRAVENOUS at 13:55

## 2023-12-13 RX ADMIN — SODIUM CHLORIDE, POTASSIUM CHLORIDE, SODIUM LACTATE AND CALCIUM CHLORIDE: 600; 310; 30; 20 INJECTION, SOLUTION INTRAVENOUS at 16:12

## 2023-12-13 RX ADMIN — LIDOCAINE HYDROCHLORIDE 30 MG: 20 INJECTION, SOLUTION INFILTRATION; PERINEURAL at 13:02

## 2023-12-13 RX ADMIN — ATORVASTATIN CALCIUM 40 MG: 40 TABLET, FILM COATED ORAL at 19:23

## 2023-12-13 RX ADMIN — ROCURONIUM BROMIDE 30 MG: 50 INJECTION, SOLUTION INTRAVENOUS at 13:02

## 2023-12-13 RX ADMIN — ONDANSETRON 4 MG: 2 INJECTION INTRAMUSCULAR; INTRAVENOUS at 14:13

## 2023-12-13 RX ADMIN — SUGAMMADEX 200 MG: 100 INJECTION, SOLUTION INTRAVENOUS at 14:22

## 2023-12-13 RX ADMIN — FENTANYL CITRATE 25 MCG: 50 INJECTION, SOLUTION INTRAMUSCULAR; INTRAVENOUS at 15:00

## 2023-12-13 RX ADMIN — DEXAMETHASONE SODIUM PHOSPHATE 4 MG: 4 INJECTION, SOLUTION INTRA-ARTICULAR; INTRALESIONAL; INTRAMUSCULAR; INTRAVENOUS; SOFT TISSUE at 13:02

## 2023-12-13 RX ADMIN — DOCUSATE SODIUM 100 MG: 100 CAPSULE, LIQUID FILLED ORAL at 19:23

## 2023-12-13 RX ADMIN — GLYCOPYRROLATE 0.2 MG: 0.2 INJECTION, SOLUTION INTRAMUSCULAR; INTRAVENOUS at 13:02

## 2023-12-13 RX ADMIN — AMLODIPINE BESYLATE 10 MG: 10 TABLET ORAL at 19:23

## 2023-12-13 RX ADMIN — PROPOFOL 50 MG: 10 INJECTION, EMULSION INTRAVENOUS at 13:55

## 2023-12-13 RX ADMIN — PHENYLEPHRINE HYDROCHLORIDE 200 MCG: 10 INJECTION INTRAVENOUS at 14:10

## 2023-12-13 RX ADMIN — PROPOFOL 150 MG: 10 INJECTION, EMULSION INTRAVENOUS at 13:02

## 2023-12-13 RX ADMIN — PHENYLEPHRINE HYDROCHLORIDE 200 MCG: 10 INJECTION INTRAVENOUS at 13:11

## 2023-12-13 RX ADMIN — FENTANYL CITRATE 25 MCG: 50 INJECTION, SOLUTION INTRAMUSCULAR; INTRAVENOUS at 14:52

## 2023-12-13 RX ADMIN — FENTANYL CITRATE 25 MCG: 50 INJECTION, SOLUTION INTRAMUSCULAR; INTRAVENOUS at 14:47

## 2023-12-13 RX ADMIN — FENTANYL CITRATE 25 MCG: 50 INJECTION, SOLUTION INTRAMUSCULAR; INTRAVENOUS at 16:35

## 2023-12-13 RX ADMIN — POTASSIUM CHLORIDE, DEXTROSE MONOHYDRATE AND SODIUM CHLORIDE: 150; 5; 450 INJECTION, SOLUTION INTRAVENOUS at 18:20

## 2023-12-13 RX ADMIN — METOPROLOL TARTRATE 12.5 MG: 25 TABLET, FILM COATED ORAL at 19:23

## 2023-12-13 RX ADMIN — SODIUM CHLORIDE, POTASSIUM CHLORIDE, SODIUM LACTATE AND CALCIUM CHLORIDE: 600; 310; 30; 20 INJECTION, SOLUTION INTRAVENOUS at 12:46

## 2023-12-13 ASSESSMENT — ACTIVITIES OF DAILY LIVING (ADL)
ADLS_ACUITY_SCORE: 22
ADLS_ACUITY_SCORE: 20
ADLS_ACUITY_SCORE: 20
ADLS_ACUITY_SCORE: 18
ADLS_ACUITY_SCORE: 22
ADLS_ACUITY_SCORE: 20
ADLS_ACUITY_SCORE: 20

## 2023-12-13 NOTE — ANESTHESIA CARE TRANSFER NOTE
Patient: Kunal Sharma    Procedure: Procedure(s):  Cystoscopy with transurethral resection of prostate       Diagnosis: Enlarged prostate [N40.0]  Diagnosis Additional Information: No value filed.    Anesthesia Type:   General     Note:      Level of Consciousness: awake  Oxygen Supplementation: face mask    Independent Airway: airway patency satisfactory and stable  Dentition: dentition unchanged  Vital Signs Stable: post-procedure vital signs reviewed and stable  Report to RN Given: handoff report given  Patient transferred to: PACU    Handoff Report: Identifed the Patient, Identified the Reponsible Provider, Reviewed the pertinent medical history, Discussed the surgical course, Reviewed Intra-OP anesthesia mangement and issues during anesthesia, Set expectations for post-procedure period and Allowed opportunity for questions and acknowledgement of understanding      Vitals:  Vitals Value Taken Time   /84 12/13/23 1430   Temp     Pulse 78 12/13/23 1433   Resp 13 12/13/23 1433   SpO2 93 % 12/13/23 1432   Vitals shown include unfiled device data.    Electronically Signed By: BUSTER Salazar CRNA  December 13, 2023  2:34 PM

## 2023-12-13 NOTE — PHARMACY-ADMISSION MEDICATION HISTORY
Admission Medication History    Admission medication history is complete. The information provided in this note is only as accurate as the sources available at the time of the update.    Medication history and patient interview completed by pre-admitting RN.  [facility], [facility] Everywhere, and chart review.     Of note, pre-admitting RN discontinued lisinopril at the time of the med history interview. UTI was initially treated with levaquin, however abx was switched to bactrim given UCx results on 23.    Addendum: corrected amlodipine from BID to once daily per vaishali [facility]    PTA Med List   Medication Sig Last Dose    amLODIPine (NORVASC) 10 MG tablet Take 1 tablet by mouth daily 2023    atorvastatin (LIPITOR) 40 MG tablet Take 40 mg by mouth At Bedtime 2023    [] levofloxacin (LEVAQUIN) 750 MG tablet Take 750 mg by mouth daily     metFORMIN (GLUCOPHAGE) 500 MG tablet Take 500 mg by mouth 2 times daily (with meals) 2023    metoprolol tartrate (LOPRESSOR) 25 MG tablet TAKE 1 TABLET BY MOUTH TWICE DAILY FOR FAST HEART RATE AND HYPERTENSION 2023    sildenafil (VIAGRA) 25 MG tablet Take 25 mg by mouth daily as needed More than a month    tamsulosin (FLOMAX) 0.4 MG capsule TAKE 1 CAPSULE BY MOUTH ONCE DAILY FOR BPH Past Week

## 2023-12-13 NOTE — ANESTHESIA POSTPROCEDURE EVALUATION
Patient: Kunal Sharma    Procedure: Procedure(s):  Cystoscopy with transurethral resection of prostate       Anesthesia Type:  General    Note:     Postop Pain Control: Uneventful            Sign Out: Well controlled pain   PONV: No   Neuro/Psych: Uneventful            Sign Out: Acceptable/Baseline neuro status   Airway/Respiratory:             Sign Out: Acceptable/Baseline resp. status   CV/Hemodynamics:             Sign Out: Acceptable CV status   Other NRE:    DID A NON-ROUTINE EVENT OCCUR?            Last vitals:  Vitals Value Taken Time   /77 12/13/23 1650   Temp 99  F (37.2  C) 12/13/23 1505   Pulse 78 12/13/23 1654   Resp 21 12/13/23 1654   SpO2 100 % 12/13/23 1651   Vitals shown include unfiled device data.    Electronically Signed By: Milind Medina DO  December 13, 2023  4:55 PM

## 2023-12-13 NOTE — ANESTHESIA PROCEDURE NOTES
Airway       Patient location during procedure: OR       Procedure Start/Stop Times: 12/13/2023 1:05 PM  Staff -        Performed By: CRNA  Consent for Airway        Urgency: elective  Indications and Patient Condition       Indications for airway management: gil-procedural and airway protection       Induction type:intravenous       Mask difficulty assessment: 1 - vent by mask    Final Airway Details       Final airway type: endotracheal airway       Successful airway: ETT - single  Endotracheal Airway Details        ETT size (mm): 8.0       Cuffed: yes       Successful intubation technique: direct laryngoscopy       DL Blade Type: Tolentino 2       Grade View of Cords: 1       Position: Right       Measured from: lips       Secured at (cm): 23       Bite block used: None    Post intubation assessment        Placement verified by: capnometry and equal breath sounds        Number of attempts at approach: 1       Number of other approaches attempted: 0       Secured with: tape       Ease of procedure: easy       Dentition: Intact    Medication(s) Administered   Medication Administration Time: 12/13/2023 1:05 PM

## 2023-12-13 NOTE — OP NOTE
SURGEON:  Elton Melton MD     PREOPERATIVE DIAGNOSIS: Enlarged prostate     POSTOPERATIVE DIAGNOSIS: Enlarged prostate     PROCEDURE PERFORMED: Cystoscopy, transurethral resection of the prostate with the bipolar electrode     ANESTHESIA:  General.     COMPLICATIONS:  None     INDICATIONS FOR PROCEDURE: This is a 73-year-old along with an enlarged prostate and urinary retention and options for transurethral resection of the prostate.    DETAILS OF PROCEDURE: The risks and benefits of the procedure were explained in detail the patient and informed consent was obtained.  The patient was brought to the operating room and placed supine on the operating table where he underwent general endotracheal anesthetic.  He was then moved down to the dorsal lithotomy position where he was prepped and draped in the standard sterile fashion.    The procedure began by using the visual obturator to introduce the 26 [place] resectoscope sheath into the bladder.  I performed a complete cystoscopy.  Identified each ureteral orifice and identified verumontanum.  There was trilobar obstruction present.    I began my resection by resecting out the median lobe tissue of the prostate.  I then resected out the left lateral lobe followed by the right lateral lobe.  At the end of the resection I resected apical tissue as well as anterior obstructing tissue.  Throughout my resection as careful not to resect distal to the level of the verumontanum and I was careful not to resect the ureteral orifices.  At the end of my resection I used the Urovac  to remove all prostate specimen.  I performed another cystoscopy to ensure proper hemostasis in the prostatic fossa and to ensure that all specimen had been removed.  I removed the scope and I drained the bladder with a 24 [place] three-way Dumont catheter.  I hand irrigated the catheter on traction until output was clear.  I then hooked up the catheter to continuous irrigation with normal  saline and the procedure was concluded.    The patient tolerated the procedure well without complications.  He went to the postanesthetic care unit in good condition.  He will be admitted to the hospital from there.

## 2023-12-13 NOTE — ANESTHESIA PREPROCEDURE EVALUATION
Anesthesia Pre-Procedure Evaluation    Patient: Kunal Sharma   MRN: [id number] : 1950        Procedure : Procedure(s):  Cystoscopy with transurethral resection of prostate          Past Medical History:   Diagnosis Date    Diabetes (H)     Hypertension       Past Surgical History:   Procedure Laterality Date    ENT SURGERY      tonsillectomy as a child    HERNIORRHAPHY INCISIONAL (LOCATION) N/A 2019    Procedure: Repair of strangulated ventral hernia with obstruction;  Surgeon: Lenny Forte MD;  Location: [facility] OR      No Known Allergies   Social History     Tobacco Use    Smoking status: Never     Passive exposure: Past    Smokeless tobacco: Never   Substance Use Topics    Alcohol use: Not Currently      Wt Readings from Last 1 Encounters:   23 119 kg (262 lb 6.4 oz)        Anesthesia Evaluation            ROS/MED HX  ENT/Pulmonary:  - neg pulmonary ROS     Neurologic:  - neg neurologic ROS     Cardiovascular:     (+) Dyslipidemia hypertension- -   -  - -                                      METS/Exercise Tolerance: >4 METS    Hematologic:  - neg hematologic  ROS     Musculoskeletal:  - neg musculoskeletal ROS     GI/Hepatic:  - neg GI/hepatic ROS     Renal/Genitourinary:       Endo: Comment: .Body mass index is 37.65 kg/m .      (+)  type II DM,                    Psychiatric/Substance Use:  - neg psychiatric ROS     Infectious Disease:  - neg infectious disease ROS     Malignancy:  - neg malignancy ROS     Other:  - neg other ROS          Physical Exam    Airway        Mallampati: II    Neck ROM: full     Respiratory Devices and Support         Dental           Cardiovascular   cardiovascular exam normal       Rhythm and rate: regular     Pulmonary   pulmonary exam normal                OUTSIDE LABS:  CBC:   Lab Results   Component Value Date    WBC 10.4 2019    HGB 15.4 2019    HCT 46.8 2019     2019     BMP:   Lab Results   Component Value Date      "12/12/2019    POTASSIUM 4.5 12/12/2019    CHLORIDE 106 12/12/2019    CO2 27 12/12/2019    BUN 15 12/12/2019    CR 1.07 12/12/2019     (H) 12/13/2023     (H) 12/12/2019     COAGS:   Lab Results   Component Value Date    PTT 29 12/12/2019    INR 0.98 12/12/2019     POC:   Lab Results   Component Value Date     (H) 12/13/2019     HEPATIC: No results found for: \"ALBUMIN\", \"PROTTOTAL\", \"ALT\", \"AST\", \"GGT\", \"ALKPHOS\", \"BILITOTAL\", \"BILIDIRECT\", \"GLENN\"  OTHER:   Lab Results   Component Value Date    AMADEO 8.9 12/12/2019       Anesthesia Plan    ASA Status:  3       Anesthesia Type: General.   Induction: Intravenous, Propofol.   Maintenance: Balanced.        Consents    Anesthesia Plan(s) and associated risks, benefits, and realistic alternatives discussed. Questions answered and patient/representative(s) expressed understanding.     - Discussed:     - Discussed with:  Patient            Postoperative Care    Pain management: IV analgesics, Oral pain medications, Multi-modal analgesia.   PONV prophylaxis: Ondansetron (or other 5HT-3), Dexamethasone or Solumedrol     Comments:               Milind Medina,     I have reviewed the pertinent notes and labs in the chart from the past 30 days and (re)examined the patient.  Any updates or changes from those notes are reflected in this note.              # Obesity: Estimated body mass index is 37.65 kg/m  as calculated from the following:    Height as of this encounter: 1.778 m (5' 10\").    Weight as of this encounter: 119 kg (262 lb 6.4 oz).      "

## 2023-12-14 LAB
ANION GAP SERPL CALCULATED.3IONS-SCNC: 8 MMOL/L (ref 7–15)
BUN SERPL-MCNC: 20 MG/DL (ref 8–23)
CALCIUM SERPL-MCNC: 8 MG/DL (ref 8.8–10.2)
CHLORIDE SERPL-SCNC: 102 MMOL/L (ref 98–107)
CREAT SERPL-MCNC: 1.3 MG/DL (ref 0.67–1.17)
DEPRECATED HCO3 PLAS-SCNC: 23 MMOL/L (ref 22–29)
EGFRCR SERPLBLD CKD-EPI 2021: 58 ML/MIN/1.73M2
ERYTHROCYTE [DISTWIDTH] IN BLOOD BY AUTOMATED COUNT: 13.1 % (ref 10–15)
GLUCOSE BLDC GLUCOMTR-MCNC: 184 MG/DL (ref 70–99)
GLUCOSE BLDC GLUCOMTR-MCNC: 213 MG/DL (ref 70–99)
GLUCOSE SERPL-MCNC: 189 MG/DL (ref 70–99)
HCT VFR BLD AUTO: 35.5 % (ref 40–53)
HGB BLD-MCNC: 11.7 G/DL (ref 13.3–17.7)
MCH RBC QN AUTO: 31.3 PG (ref 26.5–33)
MCHC RBC AUTO-ENTMCNC: 33 G/DL (ref 31.5–36.5)
MCV RBC AUTO: 95 FL (ref 78–100)
PATH REPORT.COMMENTS IMP SPEC: NORMAL
PATH REPORT.COMMENTS IMP SPEC: NORMAL
PATH REPORT.FINAL DX SPEC: NORMAL
PATH REPORT.GROSS SPEC: NORMAL
PATH REPORT.MICROSCOPIC SPEC OTHER STN: NORMAL
PATH REPORT.RELEVANT HX SPEC: NORMAL
PHOTO IMAGE: NORMAL
PLATELET # BLD AUTO: 275 10E3/UL (ref 150–450)
POTASSIUM SERPL-SCNC: 4.8 MMOL/L (ref 3.4–5.3)
RBC # BLD AUTO: 3.74 10E6/UL (ref 4.4–5.9)
SODIUM SERPL-SCNC: 133 MMOL/L (ref 135–145)
WBC # BLD AUTO: 12.4 10E3/UL (ref 4–11)

## 2023-12-14 PROCEDURE — 85027 COMPLETE CBC AUTOMATED: CPT | Performed by: UROLOGY

## 2023-12-14 PROCEDURE — 36415 COLL VENOUS BLD VENIPUNCTURE: CPT | Performed by: UROLOGY

## 2023-12-14 PROCEDURE — 250N000013 HC RX MED GY IP 250 OP 250 PS 637: Performed by: UROLOGY

## 2023-12-14 PROCEDURE — 258N000003 HC RX IP 258 OP 636: Performed by: UROLOGY

## 2023-12-14 PROCEDURE — 82310 ASSAY OF CALCIUM: CPT | Performed by: UROLOGY

## 2023-12-14 PROCEDURE — 88305 TISSUE EXAM BY PATHOLOGIST: CPT | Mod: 26 | Performed by: PATHOLOGY

## 2023-12-14 PROCEDURE — 82962 GLUCOSE BLOOD TEST: CPT

## 2023-12-14 PROCEDURE — 82947 ASSAY GLUCOSE BLOOD QUANT: CPT | Performed by: UROLOGY

## 2023-12-14 PROCEDURE — 120N000001 HC R&B MED SURG/OB

## 2023-12-14 RX ADMIN — METOPROLOL TARTRATE 12.5 MG: 25 TABLET, FILM COATED ORAL at 20:34

## 2023-12-14 RX ADMIN — DOCUSATE SODIUM 100 MG: 100 CAPSULE, LIQUID FILLED ORAL at 20:34

## 2023-12-14 RX ADMIN — DOCUSATE SODIUM 100 MG: 100 CAPSULE, LIQUID FILLED ORAL at 09:04

## 2023-12-14 RX ADMIN — POTASSIUM CHLORIDE, DEXTROSE MONOHYDRATE AND SODIUM CHLORIDE 100 ML/HR: 150; 5; 450 INJECTION, SOLUTION INTRAVENOUS at 04:22

## 2023-12-14 RX ADMIN — ATORVASTATIN CALCIUM 40 MG: 40 TABLET, FILM COATED ORAL at 20:34

## 2023-12-14 RX ADMIN — METOPROLOL TARTRATE 12.5 MG: 25 TABLET, FILM COATED ORAL at 09:04

## 2023-12-14 RX ADMIN — AMLODIPINE BESYLATE 10 MG: 10 TABLET ORAL at 09:04

## 2023-12-14 RX ADMIN — METFORMIN HYDROCHLORIDE 500 MG: 500 TABLET ORAL at 09:04

## 2023-12-14 RX ADMIN — METFORMIN HYDROCHLORIDE 500 MG: 500 TABLET ORAL at 17:32

## 2023-12-14 ASSESSMENT — ACTIVITIES OF DAILY LIVING (ADL)
ADLS_ACUITY_SCORE: 20
ADLS_ACUITY_SCORE: 22
ADLS_ACUITY_SCORE: 20
ADLS_ACUITY_SCORE: 22
ADLS_ACUITY_SCORE: 20

## 2023-12-14 NOTE — CARE PLAN
Pt arrived to unit. Ambulated from cart to bed. CBI running mod- output light red, lópez patent. Pain minimal per pt report, no clots noted. Pt denies nausea. Tolerated crackers- will advance diet. VSS.

## 2023-12-14 NOTE — PLAN OF CARE
PRIMARY DIAGNOSIS: S/P TURP  OUTPATIENT/OBSERVATION GOALS TO BE MET BEFORE DISCHARGE:  1. Stable vital signs Yes  2. Tolerating diet:Yes  3. Pain controlled with oral pain medications:  Yes - denies pain  4. Positive bowel sounds:  Yes  5. Voiding without difficulty:  No - 3 way catheter in place with CBI running at mod rate  6. Able to ambulate:   has not been up yet  7. Provider specific discharge goals met:  No    Discharge Planner Nurse   Safe discharge environment identified: Yes  Barriers to discharge: Yes - CBI wean, ambulate       Entered by: Ameena Gibson RN 12/14/2023        /68  Pulse 88  Temp 98  F  Resp 16  SpO2 98% on RA      Denies pain. Alert, oriented. CBI running at moderate rate; darkens with minimal activity/slowing of drip. Tolerating diet, now saline locked. Has not been up. PCDs in place. No BM today, senna given. Likely discharge tomorrow per urology.     Please review provider order for any additional goals.   Nurse to notify provider when observation goals have been met and patient is ready for discharge.

## 2023-12-14 NOTE — UTILIZATION REVIEW
[facility]   Admission Status; Secondary Review Determination   Admission date:  12/13/2023 10:34 AM    Under the authority of the Utilization Management Committee, the utilization review process indicated a secondary review on the above patient. The review outcome is based on review of the medical records, discussions with staff, and applying clinical experience noted on the date of the review.     (x) Inpatient Status Appropriate - This patient's medical care is consistent with medical management for inpatient care and reasonable inpatient medical practice.     RATIONALE FOR DETERMINATION   73-year-old male with a history of BPH underwent cystoscopy with transurethral resection of the prostate with bipolar electrode yesterday on 12/13.  He remains with CBI in place and continuous bladder irrigation.  He continues to have hematuria.  The plan is to continue bladder irrigation overnight and reassess his status tomorrow.  This patient continues to require postoperative management with CBI, monitoring of urine, and labs which will require a greater than or equal to 2 midnight hospitalization and per Medicare guidelines appropriate for inpatient hospitalization.  This recommendation was paged to the urology team.  The severity of illness, intensity of service provided, expected LOS and risk for adverse outcome make the care complex, high risk and appropriate for hospital admission.    At the time of admission with the information available to the attending physician more than 2 nights Hospital complex care was anticipated, based on patient risk of adverse outcome if treated as outpatient and complex care required.  Inpatient admission is appropriate based on the Medicare guidelines.      The information on this document is developed by the utilization review team in order for the business office to ensure compliance. This only denotes the appropriateness of proper admission status and does not reflect the  quality of care rendered.   The definitions of Inpatient Status and Observation Status used in making the determination above are those provided in the CMS Coverage Manual, Chapter 1 and Chapter 6, section 70.4.     Sincerely,   Kunal Dolan DO, MPH   Physician Advisor  Utilization Review  [facility]

## 2023-12-14 NOTE — PLAN OF CARE
PRIMARY DIAGNOSIS: S/P TURP  OUTPATIENT/OBSERVATION GOALS TO BE MET BEFORE DISCHARGE:  1. Stable vital signs: Yes  2. Tolerating diet: Yes  3. Pain controlled with oral pain medications:  Yes - denies pain  4. Positive bowel sounds:  Yes  5. Voiding without difficulty:  No - 3 way catheter in place with CBI running at mod rate  6. Able to ambulate: yes - ambulated halls this evening with SBA  7. Provider specific discharge goals met:  No    Discharge Planner Nurse   Safe discharge environment identified: Yes  Barriers to discharge: Yes - CBI wean       Entered by: Ameena Gibson RN 12/14/2023        /66  Pulse 78  Temp 98.4  F  Resp 16  SpO2 95%      Denies pain. Alert, oriented. CBI running at slow rate; darkened with walk, cleared quickly. Tolerating diet, now saline locked. Ambulated in halls this evening. PCDs in place. No BM today, senna given, + flatus. Likely discharge tomorrow per urology. Patient has had lópez in past at home.     Please review provider order for any additional goals.   Nurse to notify provider when observation goals have been met and patient is ready for discharge.

## 2023-12-14 NOTE — PLAN OF CARE
PRIMARY DIAGNOSIS: s/p cysto w/TURP  OUTPATIENT/OBSERVATION GOALS TO BE MET BEFORE DISCHARGE:  1. Stable vital signs Yes  2. Tolerating diet:Yes  3. Pain controlled with oral pain medications:  Yes  4. Positive bowel sounds:  Yes  5. Voiding without difficulty:  No  6. Able to ambulate:  Yes  7. Provider specific discharge goals met:  No    Discharge Planner Nurse   Safe discharge environment identified: Yes  Barriers to discharge: Yes       Entered by: Vannessa Johnson RN 12/13/2023     Please review provider order for any additional goals.   Nurse to notify provider when observation goals have been met and patient is ready for discharge.    AOx4. Up SBA. 3 way lópez patent- irrigation running at moderate speed- output light red/pink, no clots, no need for hand irrigation. Pain 2-6/10 improved with PRN 5mg oxy. Fluids running per orders. Tolerated diet.

## 2023-12-14 NOTE — PLAN OF CARE
PRIMARY DIAGNOSIS: S/P TURP  OUTPATIENT/OBSERVATION GOALS TO BE MET BEFORE DISCHARGE:  1. Stable vital signs Yes  2. Tolerating diet:Yes  3. Pain controlled with oral pain medications:  Yes - denies pain  4. Positive bowel sounds:  Yes  5. Voiding without difficulty:  No - 3 way catheter in place with CBI running at mod rate  6. Able to ambulate:   has not been up yet  7. Provider specific discharge goals met:  No    Discharge Planner Nurse   Safe discharge environment identified: Yes  Barriers to discharge: Yes - CBI wean, ambulate       Entered by: Ameena Gibson RN 12/14/2023      Please review provider order for any additional goals.   Nurse to notify provider when observation goals have been met and patient is ready for discharge.

## 2023-12-14 NOTE — PROGRESS NOTES
[facility]rology Progress Note          Assessment and Plan:     Assessment:     POD 1 Cystoscopy, transurethral resection of the prostate with the bipolar electrode     Enlarged prostate      Plan:   -Continue with three-way Dumont catheter and CBI.  Continue to try to wean CBI.  -Ambulate.  -Saline lock.  -Would like urine to be relatively clear and more of light pink prior to discharge.  Discussed with patient and nursing that likely this will be tomorrow based on current appearance.    Мария Richardson PA-C   [facility]  [phone]               Interval History:     Doing well.  Denies pain.  Had some initially but resolved.  Denies N/V/F/C/SOB/CP.   3-way Dumont catheter in place with CBI on moderate rate.  Urine is clear red.  Discussed with nursing, has been fluctuant as having been trying to wean.  Has not been OOB.  Tolerated breakfast.  No gas or BM yet.  Hemoglobin 11.7.  WBC 12.4.  Tmax 99.  No tachycardia.              Review of Systems:     The 5 point Review of Systems is negative other than noted in the HPI             Medications:     Current Facility-Administered Medications Ordered in Epic   Medication Dose Route Frequency Last Rate Last Admin    amLODIPine (NORVASC) tablet 10 mg  10 mg Oral Daily   10 mg at 12/13/23 1923    atorvastatin (LIPITOR) tablet 40 mg  40 mg Oral At Bedtime   40 mg at 12/13/23 1923    ceFAZolin Sodium (ANCEF) injection 2 g  2 g Intravenous See Admin Instructions        dextrose 5% and 0.45% NaCl + KCl 20 mEq/L infusion   Intravenous Continuous   Stopped at 12/14/23 0800    docusate sodium (COLACE) capsule 100 mg  100 mg Oral BID   100 mg at 12/13/23 1923    lactated ringers infusion   Intravenous Continuous   Stopped at 12/13/23 1821    levofloxacin (LEVAQUIN) tablet 750 mg  750 mg Oral Daily   750 mg at 12/13/23 1923    lidocaine (LMX4) cream   Topical Q1H PRN        lidocaine (LMX4) cream   Topical Q1H PRN        lidocaine 1 % 0.1-1 mL  0.1-1  mL Other Q1H PRN        lidocaine 1 % 0.1-1 mL  0.1-1 mL Other Q1H PRN        metFORMIN (GLUCOPHAGE) tablet 500 mg  500 mg Oral BID w/meals   500 mg at 12/13/23 1820    metoprolol tartrate (LOPRESSOR) half-tab 12.5 mg  12.5 mg Oral BID   12.5 mg at 12/13/23 1923    naloxone (NARCAN) injection 0.2 mg  0.2 mg Intravenous Q2 Min PRN        Or    naloxone (NARCAN) injection 0.4 mg  0.4 mg Intravenous Q2 Min PRN        Or    naloxone (NARCAN) injection 0.2 mg  0.2 mg Intramuscular Q2 Min PRN        Or    naloxone (NARCAN) injection 0.4 mg  0.4 mg Intramuscular Q2 Min PRN        ondansetron (ZOFRAN ODT) ODT tab 4 mg  4 mg Oral Q6H PRN        Or    ondansetron (ZOFRAN) injection 4 mg  4 mg Intravenous Q6H PRN        oxyCODONE (ROXICODONE) tablet 5 mg  5 mg Oral Q4H PRN   5 mg at 12/13/23 1923    Or    oxyCODONE (ROXICODONE) tablet 10 mg  10 mg Oral Q4H PRN        prochlorperazine (COMPAZINE) injection 5 mg  5 mg Intravenous Q6H PRN        Or    prochlorperazine (COMPAZINE) tablet 5 mg  5 mg Oral Q6H PRN        sodium chloride (PF) 0.9% PF flush 3 mL  3 mL Intracatheter Q8H        sodium chloride (PF) 0.9% PF flush 3 mL  3 mL Intracatheter q1 min prn        sodium chloride (PF) 0.9% PF flush 3 mL  3 mL Intracatheter Q8H        sodium chloride (PF) 0.9% PF flush 3 mL  3 mL Intracatheter q1 min prn         Current Outpatient Medications Ordered in Epic   Medication    acetaminophen (TYLENOL) 325 MG tablet    hydrOXYzine HCl (ATARAX) 10 MG tablet    ondansetron (ZOFRAN ODT) 4 MG ODT tab                  Physical Exam:   Vitals were reviewed  Patient Vitals for the past 8 hrs:   BP Temp Temp src Pulse Resp SpO2   12/14/23 0746 123/67 97.7  F (36.5  C) Oral 78 18 96 %   12/14/23 0352 112/61 98.3  F (36.8  C) Oral 65 16 96 %     GEN: NAD, sitting up in bed  EYES: EOMI  MOUTH: MMM  NECK: Supple  RESP: Unlabored breathing  SKIN: Warm  NEURO: AAO  URO: 3-way Dumont catheter in place with CBI on moderate rate.  Urine is clear red.     "       Data:   No results found for: \"NTBNPI\", \"NTBNP\"  Lab Results   Component Value Date    WBC 12.4 (H) 12/14/2023    WBC 10.4 12/12/2019    HGB 11.7 (L) 12/14/2023    HGB 15.4 12/12/2019    HCT 35.5 (L) 12/14/2023    HCT 46.8 12/12/2019    MCV 95 12/14/2023    MCV 94 12/12/2019     12/14/2023     12/12/2019     Lab Results   Component Value Date    INR 0.98 12/12/2019      "

## 2023-12-14 NOTE — PROGRESS NOTES
PRIMARY DIAGNOSIS: S/P CYSTOSCOPY  OUTPATIENT/OBSERVATION GOALS TO BE MET BEFORE DISCHARGE:  1. Stable vital signs Yes  2. Tolerating diet:Yes  3. Pain controlled with oral pain medications:  Yes  4. Positive bowel sounds:  Yes  5. Voiding without difficulty:  No  6. Able to ambulate:  Yes  7. Provider specific discharge goals met:  No    Discharge Planner Nurse   Safe discharge environment identified: Yes  Barriers to discharge: Yes       Entered by: Samantha Resendiz RN 12/14/2023 1:37 AM    A & O x 4. 3 way lópez in place with irrigation running at moderate rate.      Please review provider order for any additional goals.   Nurse to notify provider when observation goals have been met and patient is ready for discharge.

## 2023-12-14 NOTE — PROGRESS NOTES
PRIMARY DIAGNOSIS: S/P CYSTOSCOPY  OUTPATIENT/OBSERVATION GOALS TO BE MET BEFORE DISCHARGE:  1. Stable vital signs Yes  2. Tolerating diet:Yes  3. Pain controlled with oral pain medications:  Yes  4. Positive bowel sounds:  Yes  5. Voiding without difficulty:  No  6. Able to ambulate:  Yes  7. Provider specific discharge goals met:  No    Discharge Planner Nurse   Safe discharge environment identified: Yes  Barriers to discharge: No       Entered by: Samantha Resendiz RN 12/14/2023 5:26 AM   A & O x 4. 3 way lópez in place with irrigation running at moderate rate. Denies pain. Plan to discharge with lópez in place. Urology following.   Please review provider order for any additional goals.   Nurse to notify provider when observation goals have been met and patient is ready for discharge.

## 2023-12-15 VITALS
HEIGHT: 70 IN | DIASTOLIC BLOOD PRESSURE: 66 MMHG | SYSTOLIC BLOOD PRESSURE: 139 MMHG | TEMPERATURE: 98.6 F | OXYGEN SATURATION: 95 % | WEIGHT: 257.94 LBS | RESPIRATION RATE: 16 BRPM | BODY MASS INDEX: 36.93 KG/M2 | HEART RATE: 72 BPM

## 2023-12-15 LAB — GLUCOSE BLDC GLUCOMTR-MCNC: 129 MG/DL (ref 70–99)

## 2023-12-15 PROCEDURE — 250N000013 HC RX MED GY IP 250 OP 250 PS 637: Performed by: UROLOGY

## 2023-12-15 RX ORDER — TAMSULOSIN HYDROCHLORIDE 0.4 MG/1
CAPSULE ORAL
COMMUNITY
Start: 2023-12-15

## 2023-12-15 RX ADMIN — DOCUSATE SODIUM 100 MG: 100 CAPSULE, LIQUID FILLED ORAL at 08:05

## 2023-12-15 RX ADMIN — METOPROLOL TARTRATE 12.5 MG: 25 TABLET, FILM COATED ORAL at 08:05

## 2023-12-15 RX ADMIN — AMLODIPINE BESYLATE 10 MG: 10 TABLET ORAL at 08:06

## 2023-12-15 RX ADMIN — METFORMIN HYDROCHLORIDE 500 MG: 500 TABLET ORAL at 08:05

## 2023-12-15 ASSESSMENT — ACTIVITIES OF DAILY LIVING (ADL)
ADLS_ACUITY_SCORE: 22
ADLS_ACUITY_SCORE: 20
ADLS_ACUITY_SCORE: 22

## 2023-12-15 NOTE — PLAN OF CARE
"  [id number]    /66 (BP Location: Right arm)   Pulse 72   Temp 98.6  F (37  C) (Oral)   Resp 16   Ht 1.778 m (5' 10\")   Wt 117 kg (257 lb 15 oz)   SpO2 95%   BMI 37.01 kg/m        Patient is alert and oriented x 4, post TURP. Patient discharging home today, with lópez catheter in place. Patient sent home with a leg bag per his request he has an outpatient appointment with urology on the 18th.                        "

## 2023-12-15 NOTE — PLAN OF CARE
Patient's After Visit Summary was reviewed with patient   Patient verbalized understanding of After Visit Summary, recommended follow up and was given an opportunity to ask questions. yes  Discharge medications sent home with patient/family: No   Discharged with son    Goal Outcome Evaluation:

## 2023-12-15 NOTE — PLAN OF CARE
"Goal Outcome Evaluation:    Care from [id number]    Inpatient Progress Note:  For complete assessment see flow sheet documentation.    /73 (BP Location: Right arm)   Pulse 77   Temp 98.2  F (36.8  C) (Oral)   Resp 16   Ht 1.778 m (5' 10\")   Wt 117 kg (257 lb 15 oz)   SpO2 98%   BMI 37.01 kg/m     Alert & oriented. Denies pain. VSS. PIV saline locked. Tolerating regular diet. CBI running on slow , draining light pink colored output. Plan of care ongoing.    Angelique Mcmillan RN       "

## 2023-12-15 NOTE — DISCHARGE SUMMARY
[facility] Discharge Summary: Urology    Kunal Sharma MRN# [id number]   Age: 73 year old YOB: 1950     Date of Admission:  12/13/2023  Date of Discharge::  12/15/2023  Admitting Physician:  Elton Melton MD  Discharge Physician:  Мария Richardson PA-C           Admission Diagnoses:   Enlarged prostate          Discharge Diagnosis:     Same          Procedures:   Cystoscopy and transurethral section of the prostate          Medications Prior to Admission:     Medications Prior to Admission   Medication Sig Dispense Refill Last Dose    amLODIPine (NORVASC) 10 MG tablet Take 1 tablet by mouth daily   12/12/2023    atorvastatin (LIPITOR) 40 MG tablet Take 40 mg by mouth At Bedtime   12/12/2023    metFORMIN (GLUCOPHAGE) 500 MG tablet Take 500 mg by mouth 2 times daily (with meals)   12/12/2023    metoprolol tartrate (LOPRESSOR) 25 MG tablet TAKE 1 TABLET BY MOUTH TWICE DAILY FOR FAST HEART RATE AND HYPERTENSION   12/13/2023    sildenafil (VIAGRA) 25 MG tablet Take 25 mg by mouth daily as needed   More than a month    [DISCONTINUED] tamsulosin (FLOMAX) 0.4 MG capsule TAKE 1 CAPSULE BY MOUTH ONCE DAILY FOR BPH   Past Week             Discharge Medications:     Current Discharge Medication List        CONTINUE these medications which have CHANGED    Details   tamsulosin (FLOMAX) 0.4 MG capsule TAKE 1 CAPSULE BY MOUTH ONCE DAILY FOR BPH.  Continue until your catheter removal on Monday.           CONTINUE these medications which have NOT CHANGED    Details   amLODIPine (NORVASC) 10 MG tablet Take 1 tablet by mouth daily      atorvastatin (LIPITOR) 40 MG tablet Take 40 mg by mouth At Bedtime      metFORMIN (GLUCOPHAGE) 500 MG tablet Take 500 mg by mouth 2 times daily (with meals)      metoprolol tartrate (LOPRESSOR) 25 MG tablet TAKE 1 TABLET BY MOUTH TWICE DAILY FOR FAST HEART RATE AND HYPERTENSION      sildenafil (VIAGRA) 25 MG tablet Take 25 mg by mouth daily as needed           STOP taking  these medications       levofloxacin (LEVAQUIN) 750 MG tablet Comments:   Reason for Stopping:                     Consultations:     None          Hospital Course:     The patient underwent the above procedure and tolerated this well. Uncomplicated post operative course. Had adequate pain control, ambulating and tolerating regular diet at the time of discharge.            Discharge Instructions and Follow-Up:     Discharge diet: Regular   Discharge activity: No lifting >15lbs or strenuous exercise for 4 week(s)   Discharge follow-up: Dr. Melton    Wound care: Ice to area for comfort  Keep wound clean and dry           Discharge Disposition:     Discharged to home        Мария Richardson PA-C   [facility]

## 2023-12-15 NOTE — PROGRESS NOTES
[facility] Progress Note          Assessment and Plan:     Assessment:     POD 2 Cystoscopy, transurethral resection of the prostate with the bipolar electrode     Enlarged prostate      Plan:   -Continue with three-way Dumont catheter and wean CBI.  Plan on likely being able to clamp CBI in the next hour or two.  -Ambulate.  -Plan to discharge early afternoon with 3-way Dumont catheter in place with CBI disconnected and irrigation port plugged.  -Plan for catheter removal in clinic on 12/18 and follow up with Dr. Melton next month.    Мария Richardson PA-C   [facility]  [phone]               Interval History:     Doing well. Denies pain.  Passing gas.  Has ambulated.  3-way Dumont catheter in place draining clear urine with a hint of pink or peach. CBI on slow drip. Afebrile without tachycardia.  Denies N/V/F/C/SOB/CP.               Review of Systems:     The 5 point Review of Systems is negative other than noted in the HPI             Medications:     Current Facility-Administered Medications Ordered in Epic   Medication Dose Route Frequency Last Rate Last Admin    amLODIPine (NORVASC) tablet 10 mg  10 mg Oral Daily   10 mg at 12/15/23 0806    atorvastatin (LIPITOR) tablet 40 mg  40 mg Oral At Bedtime   40 mg at 12/14/23 2034    ceFAZolin Sodium (ANCEF) injection 2 g  2 g Intravenous See Admin Instructions        dextrose 5% and 0.45% NaCl + KCl 20 mEq/L infusion   Intravenous Continuous   Stopped at 12/14/23 0800    docusate sodium (COLACE) capsule 100 mg  100 mg Oral BID   100 mg at 12/15/23 0805    lactated ringers infusion   Intravenous Continuous   Stopped at 12/13/23 1821    levofloxacin (LEVAQUIN) tablet 750 mg  750 mg Oral Daily   750 mg at 12/13/23 1923    lidocaine (LMX4) cream   Topical Q1H PRN        lidocaine (LMX4) cream   Topical Q1H PRN        lidocaine 1 % 0.1-1 mL  0.1-1 mL Other Q1H PRN        lidocaine 1 % 0.1-1 mL  0.1-1 mL Other Q1H PRN        metFORMIN  (GLUCOPHAGE) tablet 500 mg  500 mg Oral BID w/meals   500 mg at 12/15/23 0805    metoprolol tartrate (LOPRESSOR) half-tab 12.5 mg  12.5 mg Oral BID   12.5 mg at 12/15/23 0805    naloxone (NARCAN) injection 0.2 mg  0.2 mg Intravenous Q2 Min PRN        Or    naloxone (NARCAN) injection 0.4 mg  0.4 mg Intravenous Q2 Min PRN        Or    naloxone (NARCAN) injection 0.2 mg  0.2 mg Intramuscular Q2 Min PRN        Or    naloxone (NARCAN) injection 0.4 mg  0.4 mg Intramuscular Q2 Min PRN        ondansetron (ZOFRAN ODT) ODT tab 4 mg  4 mg Oral Q6H PRN        Or    ondansetron (ZOFRAN) injection 4 mg  4 mg Intravenous Q6H PRN        oxyCODONE (ROXICODONE) tablet 5 mg  5 mg Oral Q4H PRN   5 mg at 12/13/23 1923    Or    oxyCODONE (ROXICODONE) tablet 10 mg  10 mg Oral Q4H PRN        prochlorperazine (COMPAZINE) injection 5 mg  5 mg Intravenous Q6H PRN        Or    prochlorperazine (COMPAZINE) tablet 5 mg  5 mg Oral Q6H PRN        sodium chloride (PF) 0.9% PF flush 3 mL  3 mL Intracatheter Q8H   3 mL at 12/14/23 2035    sodium chloride (PF) 0.9% PF flush 3 mL  3 mL Intracatheter q1 min prn        sodium chloride (PF) 0.9% PF flush 3 mL  3 mL Intracatheter Q8H   3 mL at 12/15/23 0441    sodium chloride (PF) 0.9% PF flush 3 mL  3 mL Intracatheter q1 min prn         Current Outpatient Medications Ordered in Epic   Medication    acetaminophen (TYLENOL) 325 MG tablet    hydrOXYzine HCl (ATARAX) 10 MG tablet    ondansetron (ZOFRAN ODT) 4 MG ODT tab                  Physical Exam:   Vitals were reviewed  Patient Vitals for the past 8 hrs:   BP Temp Temp src Pulse Resp SpO2   12/15/23 0802 123/70 98.1  F (36.7  C) Oral 85 17 98 %   12/15/23 0401 122/73 98.2  F (36.8  C) Oral 77 16 98 %     GEN: NAD, sitting up in bed  EYES: EOMI  MOUTH: MMM  NECK: Supple  RESP: Unlabored breathing  SKIN: Warm  NEURO: AAO  URO:  3-way Dumont catheter in place draining clear urine with a hint of pink or peach. CBI on slow drip.           Data:   No results  "found for: \"NTBNPI\", \"NTBNP\"  Lab Results   Component Value Date    WBC 12.4 (H) 12/14/2023    WBC 10.4 12/12/2019    HGB 11.7 (L) 12/14/2023    HGB 15.4 12/12/2019    HCT 35.5 (L) 12/14/2023    HCT 46.8 12/12/2019    MCV 95 12/14/2023    MCV 94 12/12/2019     12/14/2023     12/12/2019     Lab Results   Component Value Date    INR 0.98 12/12/2019      "

## 2023-12-18 ENCOUNTER — ALLIED HEALTH/NURSE VISIT (OUTPATIENT)
Dept: UROLOGY | Facility: CLINIC | Age: 73
End: 2023-12-18
Payer: MEDICARE

## 2023-12-18 DIAGNOSIS — N40.0 ENLARGED PROSTATE: Primary | ICD-10-CM

## 2023-12-18 PROCEDURE — 99207 PR NO CHARGE NURSE ONLY: CPT

## 2023-12-18 NOTE — PROGRESS NOTES
Kunal Sharma comes into clinic today at the request of Dr. Melton Ordering Provider for Trial of Void.    Patient presents to clinic for a trial of void per MD order. Patient properly identified and procedure explained to patient. Patient's leg-bag emptied, clear-yellow urine drained from patient's catheter. Catheter was then detached from leg-bag and sterile cysto tubing inserted into catheter opening. Via gravity 300 cc's sterile water was gently instilled with brief pauses into bladder. Patient tolerated fairly well and then catheter balloon was completely deflated. Dumont catheter was removed from bladder. Patient was able to successfully void 225 cc's following procedure. Patient was instructed to drink plenty of water, (At least 6-8 glasses daily). Patient instructed to call office during daytime hours, otherwise go to ER if unable to urinate/difficulty emptying. Patient verbalized understanding of this and will follow-up with MD as planned. Pt will take one Bactrim today following trial of void.    This service provided today was under the supervising provider of the day Dr. Cespedes, who was available if needed.    Rosa Jurado, CMA

## 2024-01-24 ENCOUNTER — OFFICE VISIT (OUTPATIENT)
Dept: UROLOGY | Facility: CLINIC | Age: 74
End: 2024-01-24
Payer: MEDICARE

## 2024-01-24 VITALS
WEIGHT: 260 LBS | SYSTOLIC BLOOD PRESSURE: 126 MMHG | HEIGHT: 70 IN | DIASTOLIC BLOOD PRESSURE: 84 MMHG | BODY MASS INDEX: 37.22 KG/M2

## 2024-01-24 DIAGNOSIS — N40.0 ENLARGED PROSTATE: ICD-10-CM

## 2024-01-24 DIAGNOSIS — R33.9 URINARY RETENTION: Primary | ICD-10-CM

## 2024-01-24 DIAGNOSIS — R97.20 ELEVATED PROSTATE SPECIFIC ANTIGEN (PSA): ICD-10-CM

## 2024-01-24 LAB
ALBUMIN UR-MCNC: 30 MG/DL
APPEARANCE UR: CLEAR
BILIRUB UR QL STRIP: NEGATIVE
COLOR UR AUTO: YELLOW
GLUCOSE UR STRIP-MCNC: NEGATIVE MG/DL
HGB UR QL STRIP: ABNORMAL
KETONES UR STRIP-MCNC: NEGATIVE MG/DL
LEUKOCYTE ESTERASE UR QL STRIP: ABNORMAL
NITRATE UR QL: NEGATIVE
PH UR STRIP: 5 [PH] (ref 5–7)
SP GR UR STRIP: 1.02 (ref 1–1.03)
UROBILINOGEN UR STRIP-ACNC: 0.2 E.U./DL

## 2024-01-24 PROCEDURE — 81003 URINALYSIS AUTO W/O SCOPE: CPT | Mod: QW | Performed by: UROLOGY

## 2024-01-24 PROCEDURE — 99024 POSTOP FOLLOW-UP VISIT: CPT | Performed by: UROLOGY

## 2024-01-24 ASSESSMENT — PAIN SCALES - GENERAL: PAINLEVEL: NO PAIN (0)

## 2024-01-24 NOTE — LETTER
1/24/2024       RE: Kunal Sharma  [address] [place] [id number]     Dear Colleague,    Thank you for referring your patient, Kunal Sharma, to the [facility] [place] at [facility]. Please see a copy of my visit note below.    Office Visit Note  [facility]  [phone]    UROLOGIC DIAGNOSES:   Enlarged prostate  History of retention  Elevated PSA  History of urinary tract infection    CURRENT INTERVENTIONS:   TURP in December  Previous self-catheterization  MRI prostate 2023    HISTORY:   Kunal underwent TURP in December and he did well after the procedure.  Pathology showed benign prostate tissue.  He had his Dumont catheter removed 1 week after the procedure.  He reports feeling well since then.  He has not needed to catheterize.  He reports a very strong urinary stream and says he is very happy with his symptoms at this time.      PAST MEDICAL HISTORY:   Past Medical History:   Diagnosis Date    Diabetes (H)     Hypertension        PAST SURGICAL HISTORY:   Past Surgical History:   Procedure Laterality Date    CYSTOSCOPY, TRANSURETHRAL RESECTION (TUR) PROSTATE, COMBINED N/A 12/13/2023    Procedure: Cystoscopy with transurethral resection of prostate;  Surgeon: Elton Melton MD;  Location: [facility] OR    ENT SURGERY      tonsillectomy as a child    HERNIORRHAPHY INCISIONAL (LOCATION) N/A 12/12/2019    Procedure: Repair of strangulated ventral hernia with obstruction;  Surgeon: Lenny Forte MD;  Location:  OR       FAMILY HISTORY: History reviewed. No pertinent family history.    SOCIAL HISTORY:   Social History     Socioeconomic History    Marital status:      Spouse name: None    Number of children: None    Years of education: None    Highest education level: None   Tobacco Use    Smoking status: Never     Passive exposure: Past    Smokeless tobacco: Never   Substance and Sexual Activity    Alcohol  "use: Not Currently    Drug use: Not Currently       Review Of Systems:  Skin: No rash, pruritis, or skin pigmentation  Eyes: No changes in vision  Ears/Nose/Throat: No changes in hearing, no nosebleeds  Respiratory: No shortness of breath, dyspnea on exertion, cough, or hemoptysis  Cardiovascular: No chest pain or palpitations  Gastrointestinal: No diarrhea or constipation. No abdominal pain. No hematochezia  Genitourinary: see HPI  Musculoskeletal: No pain or swelling of joints, normal range of motion  Neurologic: No weakness or tremors  Psychiatric: No recent changes in memory or mood  Hematologic/Lymphatic/Immunologic: No easy bruising or enlarged lymph nodes  Endocrine: No weight gain or loss      PHYSICAL EXAM:    /84   Ht 1.778 m (5' 10\")   Wt 117.9 kg (260 lb)   BMI 37.31 kg/m      Constitutional: Well developed. Conversant and in no acute distress  Eyes: Anicteric sclera, conjunctiva clear, normal extraocular movements  ENT: Normocephalic and atraumatic,   Skin: Warm and dry. No rashes or lesions  Cardiac: No peripheral edema  Back/Flank: Not done  CNS/PNS: Normal musculature and movements, moves all extremities normally  Respiratory: Normal non-labored breathing  Abdomen: Soft nontender and nondistended  Peripheral Vascular: No peripheral edema  Mental Status/Psych: Alert and Oriented x 3. Normal mood and affect    Penis: Not done  Scrotal Skin: Not done  Testicles: Not done  Epididymis: Not done  Digital Rectal Exam:     Cystoscopy: Not done    Imaging: None    Urinalysis: UA RESULTS:  Recent Labs   Lab Test 09/08/23  0804   COLOR Yellow   APPEARANCE Cloudy*   URINEGLC Negative   URINEBILI Negative   URINEKETONE Negative   SG 1.010   UBLD Small*   URINEPH 5.5   PROTEIN 30*   UROBILINOGEN 0.2   NITRITE Negative   LEUKEST Large*       PSA: 7.99    Post Void Residual: 206mL    Other labs: None today      IMPRESSION:  Enlarged prostate  Incomplete bladder emptying  Elevated PSA    PLAN:  He is doing " well after TURP.  He does have some residual incomplete bladder emptying.  I recommended observation at this time.  I will see him back again in 1 year.        Elton Melton M.D.

## 2024-01-24 NOTE — NURSING NOTE
Chief Complaint   Patient presents with    Surgical Followup     Pt here for follow up after surgery      Pt states he is doing very well after surgery, no issues.    Rosa Jurado, CMA

## 2024-01-24 NOTE — PROGRESS NOTES
Office Visit Note  [facility]  [phone]    UROLOGIC DIAGNOSES:   Enlarged prostate  History of retention  Elevated PSA  History of urinary tract infection    CURRENT INTERVENTIONS:   TURP in December  Previous self-catheterization  MRI prostate 2023    HISTORY:   Kunal underwent TURP in December and he did well after the procedure.  Pathology showed benign prostate tissue.  He had his Dumont catheter removed 1 week after the procedure.  He reports feeling well since then.  He has not needed to catheterize.  He reports a very strong urinary stream and says he is very happy with his symptoms at this time.      PAST MEDICAL HISTORY:   Past Medical History:   Diagnosis Date    Diabetes (H)     Hypertension        PAST SURGICAL HISTORY:   Past Surgical History:   Procedure Laterality Date    CYSTOSCOPY, TRANSURETHRAL RESECTION (TUR) PROSTATE, COMBINED N/A 12/13/2023    Procedure: Cystoscopy with transurethral resection of prostate;  Surgeon: Elton Melton MD;  Location:  OR    ENT SURGERY      tonsillectomy as a child    HERNIORRHAPHY INCISIONAL (LOCATION) N/A 12/12/2019    Procedure: Repair of strangulated ventral hernia with obstruction;  Surgeon: Lenny Forte MD;  Location:  OR       FAMILY HISTORY: History reviewed. No pertinent family history.    SOCIAL HISTORY:   Social History     Socioeconomic History    Marital status:      Spouse name: None    Number of children: None    Years of education: None    Highest education level: None   Tobacco Use    Smoking status: Never     Passive exposure: Past    Smokeless tobacco: Never   Substance and Sexual Activity    Alcohol use: Not Currently    Drug use: Not Currently       Review Of Systems:  Skin: No rash, pruritis, or skin pigmentation  Eyes: No changes in vision  Ears/Nose/Throat: No changes in hearing, no nosebleeds  Respiratory: No shortness of breath, dyspnea on exertion, cough, or hemoptysis  Cardiovascular: No chest  "pain or palpitations  Gastrointestinal: No diarrhea or constipation. No abdominal pain. No hematochezia  Genitourinary: see HPI  Musculoskeletal: No pain or swelling of joints, normal range of motion  Neurologic: No weakness or tremors  Psychiatric: No recent changes in memory or mood  Hematologic/Lymphatic/Immunologic: No easy bruising or enlarged lymph nodes  Endocrine: No weight gain or loss      PHYSICAL EXAM:    /84   Ht 1.778 m (5' 10\")   Wt 117.9 kg (260 lb)   BMI 37.31 kg/m      Constitutional: Well developed. Conversant and in no acute distress  Eyes: Anicteric sclera, conjunctiva clear, normal extraocular movements  ENT: Normocephalic and atraumatic,   Skin: Warm and dry. No rashes or lesions  Cardiac: No peripheral edema  Back/Flank: Not done  CNS/PNS: Normal musculature and movements, moves all extremities normally  Respiratory: Normal non-labored breathing  Abdomen: Soft nontender and nondistended  Peripheral Vascular: No peripheral edema  Mental Status/Psych: Alert and Oriented x 3. Normal mood and affect    Penis: Not done  Scrotal Skin: Not done  Testicles: Not done  Epididymis: Not done  Digital Rectal Exam:     Cystoscopy: Not done    Imaging: None    Urinalysis: UA RESULTS:  Recent Labs   Lab Test 09/08/23  0804   COLOR Yellow   APPEARANCE Cloudy*   URINEGLC Negative   URINEBILI Negative   URINEKETONE Negative   SG 1.010   UBLD Small*   URINEPH 5.5   PROTEIN 30*   UROBILINOGEN 0.2   NITRITE Negative   LEUKEST Large*       PSA: 7.99    Post Void Residual: 206mL    Other labs: None today      IMPRESSION:  Enlarged prostate  Incomplete bladder emptying  Elevated PSA    PLAN:  He is doing well after TURP.  He does have some residual incomplete bladder emptying.  I recommended observation at this time.  I will see him back again in 1 year.        Elton Melton M.D.            "

## 2024-12-22 ENCOUNTER — HEALTH MAINTENANCE LETTER (OUTPATIENT)
Age: 74
End: 2024-12-22

## (undated) DEVICE — Device

## (undated) DEVICE — GLOVE BIOGEL PI ULTRATOUCH SZ 7.5 41175

## (undated) DEVICE — CATH FOLEY 3WAY 24FR 30ML LUBRICATH LATEX 0167L24

## (undated) DEVICE — TUBING SET IRRIGATION 4 LEAD 90" DYND19124

## (undated) DEVICE — LINEN TOWEL PACK X10 5473

## (undated) DEVICE — SUCTION TIP YANKAUER W/O VENT K86

## (undated) DEVICE — EVACUATOR BLADDER UROVAC LATEX M0067301250

## (undated) DEVICE — SU VICRYL 0 CT-2 27" J334H

## (undated) DEVICE — SOL NACL 0.9% IRRIG 3000ML BAG 2B7477

## (undated) DEVICE — LINEN FULL SHEET 5511

## (undated) DEVICE — BLADE CLIPPER SGL USE 9680

## (undated) DEVICE — GLOVE PROTEXIS POWDER FREE 8.0 ORTHOPEDIC 2D73ET80

## (undated) DEVICE — DRSG STERI STRIP 1/2X4" R1547

## (undated) DEVICE — PREP SCRUB SOL EXIDINE 4% CHG 4OZ 29002-404

## (undated) DEVICE — SOL NACL 0.9% IRRIG 1000ML BOTTLE 2F7124

## (undated) DEVICE — SUCTION CANISTER MEDIVAC LINER 3000ML W/LID 65651-530

## (undated) DEVICE — SU VICRYL 3-0 SH 27" UND J416H

## (undated) DEVICE — DECANTER BAG 2002S

## (undated) DEVICE — LINEN HALF SHEET 5512

## (undated) DEVICE — PREP CHLORAPREP 26ML TINTED ORANGE  260815

## (undated) DEVICE — BAG URINARY DRAIN 4000ML LF 153509

## (undated) DEVICE — BAG CLEAR TRASH 1.3M 39X33" P4040C

## (undated) DEVICE — CATH HOLDER STRAP 36600

## (undated) DEVICE — DRAPE LAP W/ARMBOARD 29410

## (undated) DEVICE — SYR 50ML CATH TIP W/O NDL 309620

## (undated) DEVICE — BASIN SET MINOR DISP

## (undated) DEVICE — GLOVE PROTEXIS BLUE W/NEU-THERA 6.5  2D73EB65

## (undated) DEVICE — ESU ELEC RESECTOSCOPIC PLASMALOOP 24FR LG 12/16DEG WA22703S

## (undated) DEVICE — TUBING SUCTION 12"X1/4" N612

## (undated) DEVICE — PACK CYSTO CUSTOM RIDGES

## (undated) DEVICE — GLOVE PROTEXIS POWDER FREE SMT 7.5  2D72PT75X

## (undated) DEVICE — CATH PLUG W/CAP 000076

## (undated) DEVICE — PAD CHUX UNDERPAD 30X36" P3036C

## (undated) DEVICE — GLOVE PROTEXIS W/NEU-THERA 6.5  2D73TE65

## (undated) DEVICE — PACK MINOR CUSTOM RIDGES SBA32RMRMA

## (undated) DEVICE — ESU GROUND PAD ADULT W/CORD E7507

## (undated) DEVICE — SU PDS II 0 CTX 60" Z990G

## (undated) DEVICE — COVER FOOTSWITCH W/CINCH 20X24" 923267

## (undated) DEVICE — SPONGE LAP 18X18" X8435

## (undated) RX ORDER — BUPIVACAINE HYDROCHLORIDE 5 MG/ML
INJECTION, SOLUTION EPIDURAL; INTRACAUDAL
Status: DISPENSED
Start: 2019-12-12

## (undated) RX ORDER — NEOSTIGMINE METHYLSULFATE 1 MG/ML
VIAL (ML) INJECTION
Status: DISPENSED
Start: 2019-12-12

## (undated) RX ORDER — FENTANYL CITRATE-0.9 % NACL/PF 10 MCG/ML
PLASTIC BAG, INJECTION (ML) INTRAVENOUS
Status: DISPENSED
Start: 2023-12-13

## (undated) RX ORDER — LIDOCAINE HYDROCHLORIDE 10 MG/ML
INJECTION, SOLUTION EPIDURAL; INFILTRATION; INTRACAUDAL; PERINEURAL
Status: DISPENSED
Start: 2023-12-13

## (undated) RX ORDER — ONDANSETRON 2 MG/ML
INJECTION INTRAMUSCULAR; INTRAVENOUS
Status: DISPENSED
Start: 2023-12-13

## (undated) RX ORDER — FENTANYL CITRATE 50 UG/ML
INJECTION, SOLUTION INTRAMUSCULAR; INTRAVENOUS
Status: DISPENSED
Start: 2019-12-12

## (undated) RX ORDER — DEXAMETHASONE SODIUM PHOSPHATE 4 MG/ML
INJECTION, SOLUTION INTRA-ARTICULAR; INTRALESIONAL; INTRAMUSCULAR; INTRAVENOUS; SOFT TISSUE
Status: DISPENSED
Start: 2023-12-13

## (undated) RX ORDER — ONDANSETRON 2 MG/ML
INJECTION INTRAMUSCULAR; INTRAVENOUS
Status: DISPENSED
Start: 2019-12-12

## (undated) RX ORDER — FENTANYL CITRATE 50 UG/ML
INJECTION, SOLUTION INTRAMUSCULAR; INTRAVENOUS
Status: DISPENSED
Start: 2023-12-13

## (undated) RX ORDER — PROPOFOL 10 MG/ML
INJECTION, EMULSION INTRAVENOUS
Status: DISPENSED
Start: 2023-12-13

## (undated) RX ORDER — LIDOCAINE HYDROCHLORIDE 10 MG/ML
INJECTION, SOLUTION EPIDURAL; INFILTRATION; INTRACAUDAL; PERINEURAL
Status: DISPENSED
Start: 2019-12-12

## (undated) RX ORDER — CEFAZOLIN SODIUM/WATER 2 G/20 ML
SYRINGE (ML) INTRAVENOUS
Status: DISPENSED
Start: 2023-12-13

## (undated) RX ORDER — LIDOCAINE HYDROCHLORIDE 20 MG/ML
JELLY TOPICAL
Status: DISPENSED
Start: 2019-12-12

## (undated) RX ORDER — CEFOTETAN DISODIUM 2 G/20ML
INJECTION, POWDER, FOR SOLUTION INTRAMUSCULAR; INTRAVENOUS
Status: DISPENSED
Start: 2019-12-12

## (undated) RX ORDER — PROPOFOL 10 MG/ML
INJECTION, EMULSION INTRAVENOUS
Status: DISPENSED
Start: 2019-12-12

## (undated) RX ORDER — DEXAMETHASONE SODIUM PHOSPHATE 4 MG/ML
INJECTION, SOLUTION INTRA-ARTICULAR; INTRALESIONAL; INTRAMUSCULAR; INTRAVENOUS; SOFT TISSUE
Status: DISPENSED
Start: 2019-12-12

## (undated) RX ORDER — GLYCOPYRROLATE 0.2 MG/ML
INJECTION INTRAMUSCULAR; INTRAVENOUS
Status: DISPENSED
Start: 2019-12-12

## (undated) RX ORDER — GLYCOPYRROLATE 0.2 MG/ML
INJECTION INTRAMUSCULAR; INTRAVENOUS
Status: DISPENSED
Start: 2023-12-13

## (undated) RX ORDER — PHENYLEPHRINE HCL IN 0.9% NACL 1 MG/10 ML
SYRINGE (ML) INTRAVENOUS
Status: DISPENSED
Start: 2019-12-12